# Patient Record
Sex: FEMALE | Race: WHITE | NOT HISPANIC OR LATINO | Employment: FULL TIME | URBAN - METROPOLITAN AREA
[De-identification: names, ages, dates, MRNs, and addresses within clinical notes are randomized per-mention and may not be internally consistent; named-entity substitution may affect disease eponyms.]

---

## 2017-01-03 ENCOUNTER — ROUTINE PRENATAL (OUTPATIENT)
Dept: OBSTETRICS AND GYNECOLOGY | Facility: CLINIC | Age: 37
End: 2017-01-03
Payer: COMMERCIAL

## 2017-01-03 VITALS
BODY MASS INDEX: 26.69 KG/M2 | WEIGHT: 165.38 LBS | DIASTOLIC BLOOD PRESSURE: 78 MMHG | SYSTOLIC BLOOD PRESSURE: 120 MMHG

## 2017-01-03 DIAGNOSIS — O09.522 AMA (ADVANCED MATERNAL AGE) MULTIGRAVIDA 35+, SECOND TRIMESTER: ICD-10-CM

## 2017-01-03 DIAGNOSIS — Z98.84 HISTORY OF GASTRIC BYPASS: ICD-10-CM

## 2017-01-03 DIAGNOSIS — Z3A.14 14 WEEKS GESTATION OF PREGNANCY: Primary | ICD-10-CM

## 2017-01-03 DIAGNOSIS — D64.9 ANEMIA, UNSPECIFIED TYPE: ICD-10-CM

## 2017-01-03 DIAGNOSIS — Z98.891 H/O CESAREAN SECTION: ICD-10-CM

## 2017-01-03 PROCEDURE — 99999 PR PBB SHADOW E&M-EST. PATIENT-LVL I: CPT | Mod: PBBFAC,,, | Performed by: OBSTETRICS & GYNECOLOGY

## 2017-01-03 PROCEDURE — 0502F SUBSEQUENT PRENATAL CARE: CPT | Mod: S$GLB,,, | Performed by: OBSTETRICS & GYNECOLOGY

## 2017-02-02 ENCOUNTER — ROUTINE PRENATAL (OUTPATIENT)
Dept: OBSTETRICS AND GYNECOLOGY | Facility: CLINIC | Age: 37
End: 2017-02-02
Payer: COMMERCIAL

## 2017-02-02 VITALS
DIASTOLIC BLOOD PRESSURE: 70 MMHG | SYSTOLIC BLOOD PRESSURE: 116 MMHG | BODY MASS INDEX: 26.33 KG/M2 | WEIGHT: 163.13 LBS

## 2017-02-02 DIAGNOSIS — Z3A.18 18 WEEKS GESTATION OF PREGNANCY: Primary | ICD-10-CM

## 2017-02-02 DIAGNOSIS — D64.9 ANEMIA, UNSPECIFIED TYPE: ICD-10-CM

## 2017-02-02 DIAGNOSIS — O09.522 AMA (ADVANCED MATERNAL AGE) MULTIGRAVIDA 35+, SECOND TRIMESTER: ICD-10-CM

## 2017-02-02 DIAGNOSIS — A60.00 GENITAL HERPES SIMPLEX, UNSPECIFIED SITE: ICD-10-CM

## 2017-02-02 DIAGNOSIS — Z98.891 H/O CESAREAN SECTION: ICD-10-CM

## 2017-02-02 PROCEDURE — 0502F SUBSEQUENT PRENATAL CARE: CPT | Mod: S$GLB,,, | Performed by: OBSTETRICS & GYNECOLOGY

## 2017-02-02 PROCEDURE — 99999 PR PBB SHADOW E&M-EST. PATIENT-LVL I: CPT | Mod: PBBFAC,,, | Performed by: OBSTETRICS & GYNECOLOGY

## 2017-02-23 ENCOUNTER — OFFICE VISIT (OUTPATIENT)
Dept: MATERNAL FETAL MEDICINE | Facility: CLINIC | Age: 37
End: 2017-02-23
Payer: COMMERCIAL

## 2017-02-23 DIAGNOSIS — O09.522 AMA (ADVANCED MATERNAL AGE) MULTIGRAVIDA 35+, SECOND TRIMESTER: ICD-10-CM

## 2017-02-23 DIAGNOSIS — O09.523 ADVANCED MATERNAL AGE IN MULTIGRAVIDA, THIRD TRIMESTER: Primary | ICD-10-CM

## 2017-02-23 PROCEDURE — 99499 UNLISTED E&M SERVICE: CPT | Mod: S$GLB,,, | Performed by: OBSTETRICS & GYNECOLOGY

## 2017-02-23 PROCEDURE — 76811 OB US DETAILED SNGL FETUS: CPT | Mod: S$GLB,,, | Performed by: OBSTETRICS & GYNECOLOGY

## 2017-02-23 NOTE — PROGRESS NOTES
Repeat OB ultrasound (see full report under imaging tab in EPIC)    Report Summary:  Impression:   A detailed fetal anatomic ultrasound examination was performed today due to the following high risk indication: advanced maternal age.  No fetal structural abnormalities are identified today, and fetal size is appropriate for EGA.  Placental location is normal without evidence of previa, and cervical length is normal.

## 2017-02-27 ENCOUNTER — ROUTINE PRENATAL (OUTPATIENT)
Dept: OBSTETRICS AND GYNECOLOGY | Facility: CLINIC | Age: 37
End: 2017-02-27
Payer: COMMERCIAL

## 2017-02-27 VITALS
SYSTOLIC BLOOD PRESSURE: 120 MMHG | DIASTOLIC BLOOD PRESSURE: 70 MMHG | WEIGHT: 171.06 LBS | BODY MASS INDEX: 27.61 KG/M2

## 2017-02-27 DIAGNOSIS — O99.842 PREVIOUS GASTRIC BYPASS AFFECTING PREGNANCY IN SECOND TRIMESTER, ANTEPARTUM: Primary | ICD-10-CM

## 2017-02-27 DIAGNOSIS — Z34.82 PRENATAL CARE, SUBSEQUENT PREGNANCY, SECOND TRIMESTER: ICD-10-CM

## 2017-02-27 PROCEDURE — 0502F SUBSEQUENT PRENATAL CARE: CPT | Mod: S$GLB,,, | Performed by: NURSE PRACTITIONER

## 2017-02-27 PROCEDURE — 99999 PR PBB SHADOW E&M-EST. PATIENT-LVL II: CPT | Mod: PBBFAC,,, | Performed by: NURSE PRACTITIONER

## 2017-02-27 NOTE — PROGRESS NOTES
"Had MFM anatomy US 2-23-17 and "it's a boy"!; Just back from cruise, was nauseous and only vomited when came home - has Rx and not losing weight; c/o itchy rash on abdomen same as when she had the twins and doesn't want Rx or Dermatology referral; Denies crampinng, bleeding; Discussed diet, wt gain, second trimester; F/U 4 weeks for DM screen, CBC and visit.  "

## 2017-03-30 ENCOUNTER — ROUTINE PRENATAL (OUTPATIENT)
Dept: OBSTETRICS AND GYNECOLOGY | Facility: CLINIC | Age: 37
End: 2017-03-30
Payer: COMMERCIAL

## 2017-03-30 ENCOUNTER — LAB VISIT (OUTPATIENT)
Dept: LAB | Facility: OTHER | Age: 37
End: 2017-03-30
Attending: NURSE PRACTITIONER
Payer: COMMERCIAL

## 2017-03-30 ENCOUNTER — TELEPHONE (OUTPATIENT)
Dept: OBSTETRICS AND GYNECOLOGY | Facility: CLINIC | Age: 37
End: 2017-03-30

## 2017-03-30 VITALS
DIASTOLIC BLOOD PRESSURE: 72 MMHG | SYSTOLIC BLOOD PRESSURE: 120 MMHG | WEIGHT: 176.38 LBS | BODY MASS INDEX: 28.47 KG/M2

## 2017-03-30 DIAGNOSIS — D64.9 ANEMIA, UNSPECIFIED TYPE: ICD-10-CM

## 2017-03-30 DIAGNOSIS — Z3A.26 26 WEEKS GESTATION OF PREGNANCY: Primary | ICD-10-CM

## 2017-03-30 DIAGNOSIS — Z34.82 PRENATAL CARE, SUBSEQUENT PREGNANCY, SECOND TRIMESTER: ICD-10-CM

## 2017-03-30 DIAGNOSIS — Z98.84 HISTORY OF GASTRIC BYPASS: ICD-10-CM

## 2017-03-30 DIAGNOSIS — R73.09 GLUCOSE TOLERANCE TEST ABNORMAL: Primary | ICD-10-CM

## 2017-03-30 DIAGNOSIS — O26.12 LOW WEIGHT GAIN DURING PREGNANCY IN SECOND TRIMESTER: ICD-10-CM

## 2017-03-30 DIAGNOSIS — O09.522 AMA (ADVANCED MATERNAL AGE) MULTIGRAVIDA 35+, SECOND TRIMESTER: ICD-10-CM

## 2017-03-30 DIAGNOSIS — Z98.891 H/O CESAREAN SECTION: ICD-10-CM

## 2017-03-30 LAB
BASOPHILS # BLD AUTO: 0.01 K/UL
BASOPHILS NFR BLD: 0.1 %
DIFFERENTIAL METHOD: ABNORMAL
EOSINOPHIL # BLD AUTO: 0.1 K/UL
EOSINOPHIL NFR BLD: 1 %
ERYTHROCYTE [DISTWIDTH] IN BLOOD BY AUTOMATED COUNT: 13.7 %
GLUCOSE SERPL-MCNC: 158 MG/DL
HCT VFR BLD AUTO: 30.2 %
HGB BLD-MCNC: 10 G/DL
LYMPHOCYTES # BLD AUTO: 1.6 K/UL
LYMPHOCYTES NFR BLD: 21.9 %
MCH RBC QN AUTO: 29.9 PG
MCHC RBC AUTO-ENTMCNC: 33.1 %
MCV RBC AUTO: 90 FL
MONOCYTES # BLD AUTO: 0.4 K/UL
MONOCYTES NFR BLD: 6 %
NEUTROPHILS # BLD AUTO: 5.2 K/UL
NEUTROPHILS NFR BLD: 70.9 %
PLATELET # BLD AUTO: 261 K/UL
PMV BLD AUTO: 10.1 FL
RBC # BLD AUTO: 3.35 M/UL
WBC # BLD AUTO: 7.36 K/UL

## 2017-03-30 PROCEDURE — 0502F SUBSEQUENT PRENATAL CARE: CPT | Mod: S$GLB,,, | Performed by: OBSTETRICS & GYNECOLOGY

## 2017-03-30 PROCEDURE — 85025 COMPLETE CBC W/AUTO DIFF WBC: CPT

## 2017-03-30 PROCEDURE — 99999 PR PBB SHADOW E&M-EST. PATIENT-LVL II: CPT | Mod: PBBFAC,,, | Performed by: OBSTETRICS & GYNECOLOGY

## 2017-03-30 PROCEDURE — 82950 GLUCOSE TEST: CPT

## 2017-03-30 NOTE — TELEPHONE ENCOUNTER
PHONE CALL: LM that she did not pass her Diabetes Screen and that Ms Marino, our , will call her to set up a 3 hour test. Also, advised she is slightly anemic and to get an over the counter iron (Ferrous sulfate 325 mg) and take one daily with orange juice to increase it's absorption in addition to her prenatal vitamins. Merissa Bustamante NP

## 2017-03-31 PROBLEM — E53.8 B12 DEFICIENCY: Status: ACTIVE | Noted: 2017-03-31

## 2017-04-05 ENCOUNTER — TELEPHONE (OUTPATIENT)
Dept: OBSTETRICS AND GYNECOLOGY | Facility: CLINIC | Age: 37
End: 2017-04-05

## 2017-04-05 NOTE — TELEPHONE ENCOUNTER
----- Message from Radhika Greenwood sent at 4/5/2017  4:58 PM CDT -----  Contact: the pt  Patient is requesting her 3hrGTT results. She can be reached at [phone]. Thank you.

## 2017-04-05 NOTE — TELEPHONE ENCOUNTER
----- Message from Natalee DANETTE Haile sent at 4/5/2017  2:16 PM CDT -----  Contact: pt   X_  1st Request  _  2nd Request  _  3rd Request        Who: JO ANN PANIAGUA [[id number]]    Why: pt is calling in regards to her test results that were faxed over from [facility]    What Number to Call Back: [phone].    When to Expect a call back: (Before the end of the day)   -- if the call is after 12:00, the call back will be tomorrow.

## 2017-04-05 NOTE — TELEPHONE ENCOUNTER
"Spoke to patient. Pt states that her records were sent over from Bruno"s daughter and called for 3 hr GTT results.  "

## 2017-04-20 ENCOUNTER — ROUTINE PRENATAL (OUTPATIENT)
Dept: OBSTETRICS AND GYNECOLOGY | Facility: CLINIC | Age: 37
End: 2017-04-20
Payer: COMMERCIAL

## 2017-04-20 ENCOUNTER — CLINICAL SUPPORT (OUTPATIENT)
Dept: OBSTETRICS AND GYNECOLOGY | Facility: CLINIC | Age: 37
End: 2017-04-20
Payer: COMMERCIAL

## 2017-04-20 VITALS
BODY MASS INDEX: 28.11 KG/M2 | WEIGHT: 174.19 LBS | DIASTOLIC BLOOD PRESSURE: 70 MMHG | SYSTOLIC BLOOD PRESSURE: 114 MMHG

## 2017-04-20 DIAGNOSIS — O09.523 AMA (ADVANCED MATERNAL AGE) MULTIGRAVIDA 35+, THIRD TRIMESTER: ICD-10-CM

## 2017-04-20 DIAGNOSIS — Z98.891 H/O CESAREAN SECTION: ICD-10-CM

## 2017-04-20 DIAGNOSIS — D64.9 ANEMIA, UNSPECIFIED TYPE: ICD-10-CM

## 2017-04-20 DIAGNOSIS — Z3A.29 29 WEEKS GESTATION OF PREGNANCY: Primary | ICD-10-CM

## 2017-04-20 DIAGNOSIS — E53.8 B12 DEFICIENCY: ICD-10-CM

## 2017-04-20 DIAGNOSIS — O26.12 LOW WEIGHT GAIN DURING PREGNANCY IN SECOND TRIMESTER: ICD-10-CM

## 2017-04-20 DIAGNOSIS — Z98.84 HISTORY OF GASTRIC BYPASS: ICD-10-CM

## 2017-04-20 DIAGNOSIS — Z23 NEED FOR TDAP VACCINATION: Primary | ICD-10-CM

## 2017-04-20 PROCEDURE — 99999 PR PBB SHADOW E&M-EST. PATIENT-LVL I: CPT | Mod: PBBFAC,,, | Performed by: OBSTETRICS & GYNECOLOGY

## 2017-04-20 PROCEDURE — 0502F SUBSEQUENT PRENATAL CARE: CPT | Mod: S$GLB,,, | Performed by: OBSTETRICS & GYNECOLOGY

## 2017-04-20 PROCEDURE — 99999 PR PBB SHADOW E&M-EST. PATIENT-LVL I: CPT | Mod: PBBFAC,,,

## 2017-04-20 PROCEDURE — 90715 TDAP VACCINE 7 YRS/> IM: CPT | Mod: S$GLB,,, | Performed by: OBSTETRICS & GYNECOLOGY

## 2017-04-20 PROCEDURE — 90471 IMMUNIZATION ADMIN: CPT | Mod: S$GLB,,, | Performed by: OBSTETRICS & GYNECOLOGY

## 2017-05-10 ENCOUNTER — HOSPITAL ENCOUNTER (EMERGENCY)
Facility: OTHER | Age: 37
Discharge: HOME | End: 2017-05-10
Attending: OBSTETRICS & GYNECOLOGY
Payer: COMMERCIAL

## 2017-05-10 ENCOUNTER — ROUTINE PRENATAL (OUTPATIENT)
Dept: OBSTETRICS AND GYNECOLOGY | Facility: CLINIC | Age: 37
End: 2017-05-10
Payer: COMMERCIAL

## 2017-05-10 ENCOUNTER — OFFICE VISIT (OUTPATIENT)
Dept: MATERNAL FETAL MEDICINE | Facility: CLINIC | Age: 37
End: 2017-05-10
Payer: COMMERCIAL

## 2017-05-10 VITALS
BODY MASS INDEX: 29.04 KG/M2 | DIASTOLIC BLOOD PRESSURE: 72 MMHG | SYSTOLIC BLOOD PRESSURE: 108 MMHG | WEIGHT: 179.88 LBS

## 2017-05-10 VITALS
OXYGEN SATURATION: 97 % | SYSTOLIC BLOOD PRESSURE: 119 MMHG | RESPIRATION RATE: 14 BRPM | DIASTOLIC BLOOD PRESSURE: 82 MMHG | TEMPERATURE: 97 F | HEART RATE: 66 BPM

## 2017-05-10 DIAGNOSIS — O09.523 AMA (ADVANCED MATERNAL AGE) MULTIGRAVIDA 35+, THIRD TRIMESTER: ICD-10-CM

## 2017-05-10 DIAGNOSIS — O36.8131 DECREASED FETAL MOVEMENT, THIRD TRIMESTER, FETUS 1: Primary | ICD-10-CM

## 2017-05-10 DIAGNOSIS — Z98.84 HISTORY OF GASTRIC BYPASS: ICD-10-CM

## 2017-05-10 DIAGNOSIS — D64.9 ANEMIA, UNSPECIFIED TYPE: ICD-10-CM

## 2017-05-10 DIAGNOSIS — A60.00 GENITAL HERPES SIMPLEX, UNSPECIFIED SITE: ICD-10-CM

## 2017-05-10 DIAGNOSIS — O99.891 CURRENT MATERNAL CONDITION AFFECTING PREGNANCY: ICD-10-CM

## 2017-05-10 DIAGNOSIS — Z3A.32 32 WEEKS GESTATION OF PREGNANCY: Primary | ICD-10-CM

## 2017-05-10 DIAGNOSIS — O09.523 ADVANCED MATERNAL AGE IN MULTIGRAVIDA, THIRD TRIMESTER: ICD-10-CM

## 2017-05-10 DIAGNOSIS — O26.12 LOW WEIGHT GAIN DURING PREGNANCY IN SECOND TRIMESTER: ICD-10-CM

## 2017-05-10 DIAGNOSIS — Z98.891 H/O CESAREAN SECTION: ICD-10-CM

## 2017-05-10 DIAGNOSIS — O36.8131 DECREASED FETAL MOVEMENT, THIRD TRIMESTER, FETUS 1: ICD-10-CM

## 2017-05-10 DIAGNOSIS — E53.8 B12 DEFICIENCY: ICD-10-CM

## 2017-05-10 DIAGNOSIS — Z36.89 ENCOUNTER FOR ULTRASOUND TO CHECK FETAL GROWTH: Primary | ICD-10-CM

## 2017-05-10 PROBLEM — O36.8130 DECREASED FETAL MOVEMENTS IN THIRD TRIMESTER: Status: ACTIVE | Noted: 2017-05-10

## 2017-05-10 LAB
ALBUMIN SERPL BCP-MCNC: 2.6 G/DL
ALP SERPL-CCNC: 146 U/L
ALT SERPL W/O P-5'-P-CCNC: 8 U/L
ANION GAP SERPL CALC-SCNC: 8 MMOL/L
AST SERPL-CCNC: 12 U/L
BASOPHILS # BLD AUTO: 0.02 K/UL
BASOPHILS NFR BLD: 0.2 %
BILIRUB SERPL-MCNC: 0.4 MG/DL
BILIRUB SERPL-MCNC: NORMAL MG/DL
BLOOD URINE, POC: NORMAL
BUN SERPL-MCNC: 3 MG/DL
CALCIUM SERPL-MCNC: 8.4 MG/DL
CHLORIDE SERPL-SCNC: 108 MMOL/L
CO2 SERPL-SCNC: 22 MMOL/L
COLOR, POC UA: YELLOW
CREAT SERPL-MCNC: 0.5 MG/DL
DIFFERENTIAL METHOD: ABNORMAL
EOSINOPHIL # BLD AUTO: 0.1 K/UL
EOSINOPHIL NFR BLD: 1 %
ERYTHROCYTE [DISTWIDTH] IN BLOOD BY AUTOMATED COUNT: 12.7 %
EST. GFR  (AFRICAN AMERICAN): >60 ML/MIN/1.73 M^2
EST. GFR  (NON AFRICAN AMERICAN): >60 ML/MIN/1.73 M^2
GLUCOSE SERPL-MCNC: 77 MG/DL
GLUCOSE UR QL STRIP: NORMAL
HCT VFR BLD AUTO: 29 %
HGB BLD-MCNC: 9.5 G/DL
KETONES UR QL STRIP: NORMAL
LEUKOCYTE ESTERASE URINE, POC: NORMAL
LYMPHOCYTES # BLD AUTO: 2.2 K/UL
LYMPHOCYTES NFR BLD: 26.4 %
MCH RBC QN AUTO: 27.7 PG
MCHC RBC AUTO-ENTMCNC: 32.8 %
MCV RBC AUTO: 85 FL
MONOCYTES # BLD AUTO: 0.6 K/UL
MONOCYTES NFR BLD: 7 %
NEUTROPHILS # BLD AUTO: 5.4 K/UL
NEUTROPHILS NFR BLD: 65 %
NITRITE, POC UA: NORMAL
PH, POC UA: 6
PLATELET # BLD AUTO: 253 K/UL
PMV BLD AUTO: 10.2 FL
POTASSIUM SERPL-SCNC: 3.4 MMOL/L
PROT SERPL-MCNC: 6.1 G/DL
PROTEIN, POC: NORMAL
RBC # BLD AUTO: 3.43 M/UL
SODIUM SERPL-SCNC: 138 MMOL/L
SPECIFIC GRAVITY, POC UA: 1.01
UROBILINOGEN, POC UA: NORMAL
WBC # BLD AUTO: 8.29 K/UL

## 2017-05-10 PROCEDURE — 99999 PR PBB SHADOW E&M-EST. PATIENT-LVL II: CPT | Mod: PBBFAC,,, | Performed by: OBSTETRICS & GYNECOLOGY

## 2017-05-10 PROCEDURE — 99284 EMERGENCY DEPT VISIT MOD MDM: CPT | Mod: 25

## 2017-05-10 PROCEDURE — 59025 FETAL NON-STRESS TEST: CPT

## 2017-05-10 PROCEDURE — 85025 COMPLETE CBC W/AUTO DIFF WBC: CPT

## 2017-05-10 PROCEDURE — 0502F SUBSEQUENT PRENATAL CARE: CPT | Mod: S$GLB,,, | Performed by: OBSTETRICS & GYNECOLOGY

## 2017-05-10 PROCEDURE — 99499 UNLISTED E&M SERVICE: CPT | Mod: S$GLB,,, | Performed by: OBSTETRICS & GYNECOLOGY

## 2017-05-10 PROCEDURE — 76818 FETAL BIOPHYS PROFILE W/NST: CPT | Mod: 26,,, | Performed by: OBSTETRICS & GYNECOLOGY

## 2017-05-10 PROCEDURE — 96360 HYDRATION IV INFUSION INIT: CPT

## 2017-05-10 PROCEDURE — 25000003 PHARM REV CODE 250: Performed by: STUDENT IN AN ORGANIZED HEALTH CARE EDUCATION/TRAINING PROGRAM

## 2017-05-10 PROCEDURE — 76819 FETAL BIOPHYS PROFIL W/O NST: CPT | Mod: S$GLB,,, | Performed by: OBSTETRICS & GYNECOLOGY

## 2017-05-10 PROCEDURE — 81002 URINALYSIS NONAUTO W/O SCOPE: CPT

## 2017-05-10 PROCEDURE — 99284 EMERGENCY DEPT VISIT MOD MDM: CPT | Mod: 25,,, | Performed by: OBSTETRICS & GYNECOLOGY

## 2017-05-10 PROCEDURE — 80053 COMPREHEN METABOLIC PANEL: CPT

## 2017-05-10 PROCEDURE — 76816 OB US FOLLOW-UP PER FETUS: CPT | Mod: S$GLB,,, | Performed by: OBSTETRICS & GYNECOLOGY

## 2017-05-10 RX ADMIN — SODIUM CHLORIDE, SODIUM LACTATE, POTASSIUM CHLORIDE, AND CALCIUM CHLORIDE 1000 ML: .6; .31; .03; .02 INJECTION, SOLUTION INTRAVENOUS at 12:05

## 2017-05-10 NOTE — PROGRESS NOTES
L&D triage notify that pt will be coming up for evaluation due to BPP of 4/8 and reports of diarrhea times 3 days per Dr Suarez. Pt ambulated out of clinic and directed to 6th floor with family member at side.

## 2017-05-10 NOTE — PROGRESS NOTES
HAD PINK SPOT THIS AM. UNABLE TO TOLERATE ORAL FE. DIARRHEA X 3 DAYS. BABY NOT MOVING AS MUCH THIS MORNING. LONG D/W PT ON FE THERAPY.

## 2017-05-10 NOTE — ED PROVIDER NOTES
Encounter Date: 5/10/2017       History     Chief Complaint   Patient presents with    Non-stress Test     Review of patient's allergies indicates:   Allergen Reactions    Sulfa (sulfonamide antibiotics) Nausea And Vomiting    Lortab asa [hydrocodone-aspirin]      Liquid only     HPI Comments: Silvia Bustamante is a 36 y.o. [id number] at 32w3d presents from clinic with a BPP 4/8. Patient also reports loose stool once per day over the last three days. Patient denies sick contacts, N/V, fevers, malaise. She lives 2.5 hours away.    This IUP is complicated by pre term delivery with twins.  Patient denies contractions, denies vaginal bleeding, denies LOF.   Fetal Movement: normal.      The history is provided by the patient.     Past Medical History:   Diagnosis Date    Herpes simplex without mention of complication      Past Surgical History:   Procedure Laterality Date    APPENDECTOMY      corrective surgery for adhesion due to the appendectomy     SECTION  2014    X 1---KJB    GASTRIC BYPASS  2013    AFTER HER BAND BROKE    HERNIA REPAIR      x2    LAPAROSCOPIC GASTRIC BANDING  2009    MOUTH SURGERY      Plastic surgery on chin       Family History   Problem Relation Age of Onset    Diabetes Maternal Grandmother     Hypertension Maternal Grandmother     Colon cancer Paternal Grandfather     Hypertension Paternal Grandfather     Hypertension Paternal Grandmother     Hypertension Maternal Grandfather     Hypertension Father     Hypertension Mother     Hypertension Brother     Breast cancer Neg Hx     Ovarian cancer Neg Hx     Cancer Neg Hx      Social History   Substance Use Topics    Smoking status: Never Smoker    Smokeless tobacco: Not on file    Alcohol use Yes      Comment: Socially ( pre pregnancy)     Review of Systems   Constitutional: Negative for fever.   HENT: Negative for sore throat.    Respiratory: Negative for shortness of breath.    Cardiovascular: Negative for chest pain.    Gastrointestinal: Negative for abdominal pain, anal bleeding, blood in stool, constipation and nausea.   Genitourinary: Negative for dysuria.   Musculoskeletal: Negative for back pain.   Skin: Negative for rash.   Neurological: Negative for facial asymmetry, weakness, light-headedness and headaches.   Hematological: Does not bruise/bleed easily.       Physical Exam   Initial Vitals   BP Pulse Resp Temp SpO2   -- -- -- -- --          BP: (108)/(72) 108/72    Physical Exam    Nursing note and vitals reviewed.  Constitutional: She appears well-developed and well-nourished. She is not diaphoretic. No distress.   HENT:   Head: Normocephalic and atraumatic.   Eyes: Conjunctivae and EOM are normal.   Neck: Normal range of motion.   Cardiovascular: Normal rate and regular rhythm.   Pulmonary/Chest: Breath sounds normal. No respiratory distress. She has no wheezes.   Abdominal: Soft. Bowel sounds are normal. She exhibits distension (gravid).   Musculoskeletal: Normal range of motion.   Neurological: She is alert and oriented to person, place, and time.   Skin: Skin is warm and dry.   Psychiatric: She has a normal mood and affect. Her behavior is normal. Judgment and thought content normal.     OB LABOR EXAM:                     Comments: NST Interpretation:   110 BPM baseline  Variability: moderate  Accelerations: present  Decelerations: absent    Clinical Impression: Reactive Non-Stress Test         ED Course   Procedures  Labs Reviewed   CBC W/ AUTO DIFFERENTIAL   COMPREHENSIVE METABOLIC PANEL   POCT URINALYSIS, DIPSTICK OR TABLET REAGENT, AUTOMATED, WITH MICROSCOP             Medical Decision Making:   Initial Assessment:   37 yo  at 32w3 days with BPP 6/10  Differential Diagnosis:   BPP 6/10 with 10/10 repeat  ED Management:  - CMP, CBC - wnl  - VSS  - NST - reactive with very good variability and accels  - Repeat BPP 5 hours later was 10/10  - Patient counseled on kick counts, decreased fetal movement, vaginal  bleeding, contractions  - Discharged in stable condition  - Follow up next week with primary OB              Attending Attestation:   Physician Attestation Statement for Resident:  As the supervising MD   Physician Attestation Statement: I have personally seen and examined this patient.   I agree with the above history. -:   As the supervising MD I agree with the above PE.    As the supervising MD I agree with the above treatment, course, plan, and disposition.   -:   NST  I independently reviewed the fetal non-stress test with the following interpretation:  110 BPM baseline  Variability: moderate  Accelerations: present  Decelerations: absent  Contractions: none  Category 1    Clinical Interpretation: reactive    Patient evaluated and found to be stable, agree with resident's assessment and plan for prolonged monitoring and repeat BPP this pm. Patient discharged after reassuring testing, repeat BPP with instructions regarding movement counts, follow up visits.  I was personally present during the critical portions of the procedure(s) performed by the resident and was immediately available in the ED to provide services and assistance as needed during the entire procedure.  I have reviewed the following: old records at this facility.                    ED Course     Clinical Impression:   The encounter diagnosis was Decreased fetal movement, third trimester, fetus 1.          Thierry Lazcano MD  Resident  05/10/17 4003       Nel Hess MD  05/12/17 1213

## 2017-05-10 NOTE — DISCHARGE INSTRUCTIONS
Call clinic [phone] or L & D after hours at [phone] for vaginal bleeding, leakage of fluids, regular contractions every 5 mins for 2 hours, decreased fetal movements ( 10 kicks in 2 hours), headache not relieved by Tylenol, blurry vision, or temp of 100.4 or greater.  Begin doing fetal kick counts, at least 10 movements in 2 hours starting at 28 weeks gestation.  Keep next clinic appointment

## 2017-05-10 NOTE — ED NOTES
Another BPP done 8/8. NST reactive and reassuring, baseline 130. Discharge instructions and patient is to follow up with Dr. Foster in 1 week.

## 2017-05-10 NOTE — ED AVS SNAPSHOT
[facility]  [address]  [place] [facility] [address]               Silvia Bustamante   5/10/2017 11:15 AM   ED    Description:  Female : 1980   Department:  [facility]           Your Care was Coordinated By:     Provider Role From To    Nel Hess MD Attending Provider 05/10/17 1128 --      Reason for Visit     Non-stress Test           Diagnoses this Visit        Comments    Decreased fetal movement, third trimester, fetus 1    -  Primary       ED Disposition     ED Disposition Condition Comment    Discharge  Sent from clinic [facility]            To Do List           Follow-up Information     Follow up with Ralf Foster Jr, MD In 1 week.    Specialties:  Obstetrics, Obstetrics and Gynecology    Contact information:    [address]  [place] [address]  [phone]        [facility]     [facility] Nurse Care Line -  Assistance  Unless otherwise directed by your provider, please contact [facility], our nurse care line that is available for  assistance.     Registered nurses in the [facility] provide: appointment scheduling, clinical advisement, health education, and other advisory services.  Call: [phone] (toll free)               Medications           Message regarding Medications     Verify the changes and/or additions to your medication regime listed below are the same as discussed with your clinician today.  If any of these changes or additions are incorrect, please notify your healthcare provider.        These medications were administered today        Dose Freq    lactated ringers bolus 1,000 mL 1,000 mL Once    Sig: Inject 1,000 mLs into the vein once.    Class: Normal    Route: Intravenous           Verify that the below list of medications is an accurate representation of the medications you are currently taking.  If none reported, the list may be blank. If incorrect, please contact your  healthcare provider. Carry this list with you in case of emergency.           Current Medications     doxylamine-pyridoxine 10-10 mg TbEC Take 1 capsule by mouth 3 (three) times daily as needed.    iron,carbon,gluc-FA-B12-C-DSS (FERRALET 90 DUAL-IRON DELIVERY) 90-1-12-50 mg-mg-mcg-mg Tab Take 1 capsule by mouth once daily.           Clinical Reference Information           Your Vitals Were     BP Pulse Temp Resp Last Period SpO2    119/82 66 96.8 °F (36 °C) 14 09/25/2016 97%      Allergies as of 5/10/2017        Reactions    Sulfa (Sulfonamide Antibiotics) Nausea And Vomiting    Lortab Asa [Hydrocodone-aspirin]     Liquid only      Immunizations Administered on Date of Encounter - 5/10/2017     None      ED Micro, Lab, POCT     Start Ordered       Status Ordering Provider    05/10/17 1141 05/10/17 1140  CBC auto differential  STAT      Final result     05/10/17 1141 05/10/17 1140  Comprehensive metabolic panel  STAT      Final result     05/10/17 1141 05/10/17 1140  POCT urinalysis, dipstick or tablet reag  Once      Final result       ED Imaging Orders     None        Discharge Instructions       Call clinic [phone] or L & D after hours at [phone] for vaginal bleeding, leakage of fluids, regular contractions every 5 mins for 2 hours, decreased fetal movements ( 10 kicks in 2 hours), headache not relieved by Tylenol, blurry vision, or temp of 100.4 or greater.  Begin doing fetal kick counts, at least 10 movements in 2 hours starting at 28 weeks gestation.  Keep next clinic appointment      Discharge References/Attachments     KICK COUNTS (ENGLISH)      Your Scheduled Appointments     May 24, 2017 11:00 AM CDT   Routine Prenatal Visit with Ralf Foster Jr., MD   [facility] [address] ([facility])    [address]  [facility] [id number] [phone]            May 31, 2017 10:40 AM CDT   Ultrasound with ULTRASOUND, [facility]LR [facility]   [facility] ([facility])    [address]  [facility] [address]   [phone]              [facility] Sign-Up     Activating your [facility] account is as easy as 1-2-3!     1) Visit [email or web address], select Sign Up Now, enter this activation code and your date of birth, then select Next.  [email or web address]  Expires: 6/24/2017  6:30 PM      2) Create a username and password to use when you visit [facility] in the future and select a security question in case you lose your password and select Next.    3) Enter your e-mail address and click Sign Up!    Additional Information  If you have questions, please e-mail [email or web address] or call [phone] to talk to our [facility] staff. Remember, [facility] is NOT to be used for urgent needs. For medical emergencies, dial 911.          [facility] complies with applicable Federal civil rights laws and does not discriminate on the basis of race, color, national origin, age, disability, or sex.        Language Assistance Services     ATTENTION: Language assistance services are available, free of charge. Please call [phone].      ATENCIÓN: Si habla margo, tiene a sorensen disposición servicios gratuitos de asistencia lingüística. Llame al [phone].     CHÚ Ý: N?u b?n nói Ti?ng Vi?t, có các d?ch v? h? tr? ngôn ng? mi?n phí dành cho b?n. G?i s? [phone].

## 2017-05-14 NOTE — PROGRESS NOTES
Indication  ========    Evaluation of fetal growth, MVP, and BPP without NST (Dr. Foster).    History  ======    General History  Other: AMA  C/S x 1  Gastric Bypass after band rupture    Method  ======    Transabdominal ultrasound examination. View: Sufficient.    Pregnancy  =========    Finney pregnancy. Number of fetuses: 1.    Dating  ======    LMP on: 9/15/2016  GA by LMP 33 w + 6 d  DALIA by LMP: 6/22/2017  Ultrasound examination on: 5/10/2017  GA by U/S based upon: AC, BPD, Femur, HC  GA by U/S 33 w + 0 d  DALIA by U/S: 6/28/2017  Assigned: The calculation of the gestational age based on CRL.  The Best Overall Assessment is based on the ultrasound examination on 11/09/16.  Assigned GA 32 w + 3 d  Assigned DALIA: 7/2/2017    General Evaluation  ==============    Cardiac activity: present.  bpm.  Fetal movements: visualized.  Presentation: cephalic.  Placenta: anterior.  Umbilical cord: 3 vessel cord.  Amniotic fluid: MVP 5.1 cm.    Biophysical Profile  ==============    2: Fetal breathing movements  0: Gross body movements  0: Fetal tone  2: Amniotic fluid volume  4/8: Biophysical profile score    Fetal Biometry  ============    Fetal Biometry  BPD 77.6 mm 31w 1d Hadlock  .5 mm 38w 2d Deirdre  .4 mm 34w 3d Hadlock  .4 mm 33w 2d Hadlock  Femur 63.9 mm 33w 0d Hadlock  Cerebellum tr 43.6 mm 35w 0d Villalpando  CM 5.9 mm  EFW 2,128 g 62% 32w 6d Hadlock  Calculated by: Hadlock (BPD-HC-AC-FL)  EFW (lb) 4 lb  EFW (oz) 11 oz  Cephalic index 0.69  HC / AC 1.05  FL / BPD 0.82  FL / AC 0.22  MVP 5.1 cm   bpm  Head / Face / Neck   6.3 mm    Fetal Anatomy  ============    Cranium: normal  Lateral ventricles: normal  Choroid plexus: normal  Midline falx: documented previously  Cavum septi pellucidi: documented previously  Cerebellum: normal  Cisterna magna: normal  Lips: not visualized  Profile: documented previously  Nose: documented previously  4-chamber view: documented  previously  RVOT: documented previously  LVOT: documented previously  Diaphragm: normal  Cord insertion: documented previously  Stomach: normal  Kidneys: normal  Bladder: normal  Genitals: documented previously  Cervical spine: documented previously  Thoracic spine: documented previously  Lumbar spine: documented previously  Sacral spine: documented previously  Arms: documented previously  Legs: documented previously    Impression  =========    1. 32w 3d ríos pregnancy  2. Normal interval fetal growth (EFW = 2128 gms at 62%)  3. No obvious fetal structural abnormalities appreciated  4. BPP without NST = 4/8  5. Amniotic fluid volume wnl (MVP 6.1cm)    Patient counseled on today's ultrasound. She reports diarrhea x 3 days, emesis throughout the pregnancy, and loose stool this am. She  reports vaginal pink discharge today. Patient counseled that she will be sent to Labor and delivery for further maternal/fetal evaluation and  surveillance secondary to BPP 4 /8.      Recommendation  ==============    Dr. Foster notified of above  Patient to be sent to labor and delivery for fetal monitoring secondary to BPP 4/8 and further maternal/fetal evaluation and surveillance.    Follow up sono with MFM in 3 wks for interval fetal growth, MVP, and overall fetal well being .

## 2017-05-24 ENCOUNTER — ROUTINE PRENATAL (OUTPATIENT)
Dept: OBSTETRICS AND GYNECOLOGY | Facility: CLINIC | Age: 37
End: 2017-05-24
Payer: COMMERCIAL

## 2017-05-24 VITALS
SYSTOLIC BLOOD PRESSURE: 110 MMHG | WEIGHT: 180.75 LBS | BODY MASS INDEX: 29.18 KG/M2 | DIASTOLIC BLOOD PRESSURE: 70 MMHG

## 2017-05-24 DIAGNOSIS — A60.00 GENITAL HERPES SIMPLEX, UNSPECIFIED SITE: ICD-10-CM

## 2017-05-24 DIAGNOSIS — D64.9 ANEMIA, UNSPECIFIED TYPE: ICD-10-CM

## 2017-05-24 DIAGNOSIS — Z3A.34 34 WEEKS GESTATION OF PREGNANCY: Primary | ICD-10-CM

## 2017-05-24 DIAGNOSIS — O26.12 LOW WEIGHT GAIN DURING PREGNANCY IN SECOND TRIMESTER: ICD-10-CM

## 2017-05-24 DIAGNOSIS — Z98.84 HISTORY OF GASTRIC BYPASS: ICD-10-CM

## 2017-05-24 DIAGNOSIS — Z98.891 H/O CESAREAN SECTION: ICD-10-CM

## 2017-05-24 DIAGNOSIS — O09.523 AMA (ADVANCED MATERNAL AGE) MULTIGRAVIDA 35+, THIRD TRIMESTER: ICD-10-CM

## 2017-05-24 PROBLEM — O36.8130 DECREASED FETAL MOVEMENTS IN THIRD TRIMESTER: Status: RESOLVED | Noted: 2017-05-10 | Resolved: 2017-05-24

## 2017-05-24 PROCEDURE — 0502F SUBSEQUENT PRENATAL CARE: CPT | Mod: S$GLB,,, | Performed by: OBSTETRICS & GYNECOLOGY

## 2017-05-24 PROCEDURE — 99999 PR PBB SHADOW E&M-EST. PATIENT-LVL I: CPT | Mod: PBBFAC,,, | Performed by: OBSTETRICS & GYNECOLOGY

## 2017-05-31 ENCOUNTER — OFFICE VISIT (OUTPATIENT)
Dept: MATERNAL FETAL MEDICINE | Facility: CLINIC | Age: 37
End: 2017-05-31
Payer: COMMERCIAL

## 2017-05-31 DIAGNOSIS — Z36.89 ENCOUNTER FOR ULTRASOUND TO CHECK FETAL GROWTH: ICD-10-CM

## 2017-05-31 PROCEDURE — 99499 UNLISTED E&M SERVICE: CPT | Mod: S$GLB,,, | Performed by: PEDIATRICS

## 2017-05-31 PROCEDURE — 76819 FETAL BIOPHYS PROFIL W/O NST: CPT | Mod: S$GLB,,, | Performed by: PEDIATRICS

## 2017-05-31 PROCEDURE — 76816 OB US FOLLOW-UP PER FETUS: CPT | Mod: S$GLB,,, | Performed by: PEDIATRICS

## 2017-05-31 NOTE — LETTER
June 2, 2017      Ralf Foster Jr., MD  [address]  [address]  [place] [address]           [facility]  [address]  [facility] [address]  Phone: [phone]          Patient: Silvia Bustamante   MR Number: [id number]   YOB: 1980   Date of Visit: 5/31/2017       Dear Dr. Ralf Foster Jr.:    Thank you for referring Silvia Bustamante to me for evaluation. Attached you will find relevant portions of my assessment and plan of care.    If you have questions, please do not hesitate to call me. I look forward to following Silvia Bustamante along with you.    Sincerely,    Racheal Miller MD    Enclosure  CC:  No Recipients    If you would like to receive this communication electronically, please contact [email or web address] or [phone] to request more information on [employer] access.    For providers and/or their staff who would like to refer a patient to [facility], please contact us through our one-stop-shop provider referral line, [facility], at [phone].    If you feel you have received this communication in error or would no longer like to receive these types of communications, please e-mail [email or web address]

## 2017-06-02 NOTE — PROGRESS NOTES
Indication  ========    Evaluation of fetal well-being. Follow-up evaluation for fetal growth.    History  ======    General History  Other: AMA  C/S x 1  Gastric Bypass after band rupture    Method  ======    Transabdominal ultrasound examination. View: Sufficient.    Pregnancy  =========    Finney pregnancy. Number of fetuses: 1.    Dating  ======    LMP on: 9/15/2016  GA by LMP 36 w + 6 d  DALIA by LMP: 6/22/2017  Ultrasound examination on: 5/31/2017  GA by U/S based upon: AC, BPD, Femur, HC  GA by U/S 35 w + 5 d  DALIA by U/S: 6/30/2017  Assigned: The calculation of the gestational age based on CRL.  The Best Overall Assessment is based on the ultrasound examination on 11/09/16.  Assigned GA 35 w + 3 d  Assigned DALIA: 7/2/2017    General Evaluation  ==============    Cardiac activity: present.  bpm.  Fetal movements: visualized.  Presentation: cephalic.  Placenta: anterior.  Umbilical cord: 3 vessel cord.  Amniotic fluid: MVP 4.0 cm.          Biophysical Profile  ==============    2: Fetal breathing movements  2: Gross body movements  2: Fetal tone  2: Amniotic fluid volume  8/8: Biophysical profile score  Interpretation: normal          Fetal Biometry  ============    Fetal Biometry  BPD 87.9 mm 35w 4d Hadlock  .4 mm  .6 mm 37w 2d Hadlock  .4 mm 35w 3d Hadlock  Femur 67.9 mm 34w 6d Hadlock  EFW 2,685 g 50% 35w 3d Hadlock  Calculated by: Hadlock (BPD-HC-AC-FL)  EFW (lb) 5 lb  EFW (oz) 15 oz  Cephalic index 0.75  HC / AC 1.05  FL / BPD 0.77  FL / AC 0.22  MVP 4.0 cm   bpm  Head / Face / Neck   4.7 mm    Fetal Anatomy  ============    Cranium: normal  Stomach: normal  Kidneys: normal  Bladder: normal        Impression  =========    Limited anatomy was negative. No anomalies seen.  AFV normal.  Fetal biometry is consistent and concordant with dating.    Reassuring fetal testing.          Recommendation  ==============    Repeat ultrasound study as clinically indicated.    Thank you  again for allowing us to participate in the care of your patients. If you have any questions concerning today's consultation, feel free  to contact me or one of my partners. We can be reached at [phone] during normal business hours. If you have a question after normal  business hours, please contact Labor and Delivery at [phone].

## 2017-06-09 ENCOUNTER — ROUTINE PRENATAL (OUTPATIENT)
Dept: OBSTETRICS AND GYNECOLOGY | Facility: CLINIC | Age: 37
End: 2017-06-09
Payer: COMMERCIAL

## 2017-06-09 ENCOUNTER — LAB VISIT (OUTPATIENT)
Dept: LAB | Facility: OTHER | Age: 37
End: 2017-06-09
Attending: OBSTETRICS & GYNECOLOGY
Payer: COMMERCIAL

## 2017-06-09 VITALS
WEIGHT: 180.75 LBS | BODY MASS INDEX: 29.18 KG/M2 | SYSTOLIC BLOOD PRESSURE: 120 MMHG | DIASTOLIC BLOOD PRESSURE: 72 MMHG

## 2017-06-09 DIAGNOSIS — O26.12 LOW WEIGHT GAIN DURING PREGNANCY IN SECOND TRIMESTER: ICD-10-CM

## 2017-06-09 DIAGNOSIS — A60.00 HERPES SIMPLEX INFECTION OF GENITOURINARY SYSTEM: ICD-10-CM

## 2017-06-09 DIAGNOSIS — Z3A.34 34 WEEKS GESTATION OF PREGNANCY: ICD-10-CM

## 2017-06-09 DIAGNOSIS — D50.9 IRON DEFICIENCY ANEMIA, UNSPECIFIED IRON DEFICIENCY ANEMIA TYPE: ICD-10-CM

## 2017-06-09 DIAGNOSIS — Z98.84 HISTORY OF GASTRIC BYPASS: ICD-10-CM

## 2017-06-09 DIAGNOSIS — Z98.891 H/O CESAREAN SECTION: ICD-10-CM

## 2017-06-09 DIAGNOSIS — Z3A.36 36 WEEKS GESTATION OF PREGNANCY: Primary | ICD-10-CM

## 2017-06-09 LAB
BASOPHILS # BLD AUTO: 0.02 K/UL
BASOPHILS NFR BLD: 0.3 %
DIFFERENTIAL METHOD: ABNORMAL
EOSINOPHIL # BLD AUTO: 0.1 K/UL
EOSINOPHIL NFR BLD: 0.7 %
ERYTHROCYTE [DISTWIDTH] IN BLOOD BY AUTOMATED COUNT: 13.3 %
HCT VFR BLD AUTO: 30.5 %
HGB BLD-MCNC: 9.8 G/DL
HIV 1+2 AB+HIV1 P24 AG SERPL QL IA: NEGATIVE
LYMPHOCYTES # BLD AUTO: 1.8 K/UL
LYMPHOCYTES NFR BLD: 26.9 %
MCH RBC QN AUTO: 26.3 PG
MCHC RBC AUTO-ENTMCNC: 32.1 %
MCV RBC AUTO: 82 FL
MONOCYTES # BLD AUTO: 0.5 K/UL
MONOCYTES NFR BLD: 7.2 %
NEUTROPHILS # BLD AUTO: 4.4 K/UL
NEUTROPHILS NFR BLD: 64.5 %
PLATELET # BLD AUTO: 255 K/UL
PMV BLD AUTO: 10.6 FL
RBC # BLD AUTO: 3.73 M/UL
RPR SER QL: NORMAL
VIT B12 SERPL-MCNC: <146 PG/ML
WBC # BLD AUTO: 6.85 K/UL

## 2017-06-09 PROCEDURE — 82607 VITAMIN B-12: CPT

## 2017-06-09 PROCEDURE — 86592 SYPHILIS TEST NON-TREP QUAL: CPT

## 2017-06-09 PROCEDURE — 86703 HIV-1/HIV-2 1 RESULT ANTBDY: CPT

## 2017-06-09 PROCEDURE — 36415 COLL VENOUS BLD VENIPUNCTURE: CPT

## 2017-06-09 PROCEDURE — 85025 COMPLETE CBC W/AUTO DIFF WBC: CPT

## 2017-06-09 PROCEDURE — 0502F SUBSEQUENT PRENATAL CARE: CPT | Mod: S$GLB,,, | Performed by: OBSTETRICS & GYNECOLOGY

## 2017-06-09 PROCEDURE — 99999 PR PBB SHADOW E&M-EST. PATIENT-LVL II: CPT | Mod: PBBFAC,,, | Performed by: OBSTETRICS & GYNECOLOGY

## 2017-06-09 RX ORDER — VALACYCLOVIR HYDROCHLORIDE 500 MG/1
500 TABLET, FILM COATED ORAL DAILY
Qty: 30 TABLET | Refills: 12 | Status: SHIPPED | OUTPATIENT
Start: 2017-06-09 | End: 2017-08-09

## 2017-06-12 LAB — BACTERIA SPEC AEROBE CULT: NORMAL

## 2017-06-16 ENCOUNTER — ROUTINE PRENATAL (OUTPATIENT)
Dept: OBSTETRICS AND GYNECOLOGY | Facility: CLINIC | Age: 37
End: 2017-06-16
Payer: COMMERCIAL

## 2017-06-16 VITALS — BODY MASS INDEX: 29.5 KG/M2 | SYSTOLIC BLOOD PRESSURE: 116 MMHG | DIASTOLIC BLOOD PRESSURE: 74 MMHG | WEIGHT: 182.75 LBS

## 2017-06-16 DIAGNOSIS — Z98.891 H/O CESAREAN SECTION: ICD-10-CM

## 2017-06-16 DIAGNOSIS — O26.12 LOW WEIGHT GAIN DURING PREGNANCY IN SECOND TRIMESTER: ICD-10-CM

## 2017-06-16 DIAGNOSIS — D50.8 IRON DEFICIENCY ANEMIA SECONDARY TO INADEQUATE DIETARY IRON INTAKE: ICD-10-CM

## 2017-06-16 DIAGNOSIS — A60.00 HERPES SIMPLEX INFECTION OF GENITOURINARY SYSTEM: ICD-10-CM

## 2017-06-16 DIAGNOSIS — O09.523 AMA (ADVANCED MATERNAL AGE) MULTIGRAVIDA 35+, THIRD TRIMESTER: ICD-10-CM

## 2017-06-16 DIAGNOSIS — E53.8 B12 DEFICIENCY: ICD-10-CM

## 2017-06-16 DIAGNOSIS — Z3A.37 37 WEEKS GESTATION OF PREGNANCY: Primary | ICD-10-CM

## 2017-06-16 PROCEDURE — 99999 PR PBB SHADOW E&M-EST. PATIENT-LVL II: CPT | Mod: PBBFAC,,, | Performed by: OBSTETRICS & GYNECOLOGY

## 2017-06-16 PROCEDURE — 0502F SUBSEQUENT PRENATAL CARE: CPT | Mod: S$GLB,,, | Performed by: OBSTETRICS & GYNECOLOGY

## 2017-06-23 ENCOUNTER — ROUTINE PRENATAL (OUTPATIENT)
Dept: OBSTETRICS AND GYNECOLOGY | Facility: CLINIC | Age: 37
End: 2017-06-23
Payer: COMMERCIAL

## 2017-06-23 VITALS
SYSTOLIC BLOOD PRESSURE: 106 MMHG | BODY MASS INDEX: 29.61 KG/M2 | WEIGHT: 183.44 LBS | DIASTOLIC BLOOD PRESSURE: 80 MMHG

## 2017-06-23 DIAGNOSIS — D50.8 IRON DEFICIENCY ANEMIA SECONDARY TO INADEQUATE DIETARY IRON INTAKE: ICD-10-CM

## 2017-06-23 DIAGNOSIS — Z98.891 H/O CESAREAN SECTION: ICD-10-CM

## 2017-06-23 DIAGNOSIS — O09.523 AMA (ADVANCED MATERNAL AGE) MULTIGRAVIDA 35+, THIRD TRIMESTER: ICD-10-CM

## 2017-06-23 DIAGNOSIS — Z98.84 HISTORY OF GASTRIC BYPASS: ICD-10-CM

## 2017-06-23 DIAGNOSIS — E53.8 B12 DEFICIENCY: ICD-10-CM

## 2017-06-23 DIAGNOSIS — A60.1 HERPES SIMPLEX INFECTION OF PERIANAL SKIN: ICD-10-CM

## 2017-06-23 DIAGNOSIS — Z3A.38 38 WEEKS GESTATION OF PREGNANCY: Primary | ICD-10-CM

## 2017-06-23 DIAGNOSIS — O26.12 LOW WEIGHT GAIN DURING PREGNANCY IN SECOND TRIMESTER: ICD-10-CM

## 2017-06-23 PROCEDURE — 99999 PR PBB SHADOW E&M-EST. PATIENT-LVL I: CPT | Mod: PBBFAC,,, | Performed by: OBSTETRICS & GYNECOLOGY

## 2017-06-23 PROCEDURE — 0502F SUBSEQUENT PRENATAL CARE: CPT | Mod: S$GLB,,, | Performed by: OBSTETRICS & GYNECOLOGY

## 2017-06-26 ENCOUNTER — HOSPITAL ENCOUNTER (INPATIENT)
Facility: OTHER | Age: 37
LOS: 4 days | Discharge: HOME | End: 2017-06-30
Attending: OBSTETRICS & GYNECOLOGY | Admitting: OBSTETRICS & GYNECOLOGY
Payer: COMMERCIAL

## 2017-06-26 ENCOUNTER — ANESTHESIA (OUTPATIENT)
Dept: OBSTETRICS AND GYNECOLOGY | Facility: OTHER | Age: 37
End: 2017-06-26
Payer: COMMERCIAL

## 2017-06-26 ENCOUNTER — SURGERY (OUTPATIENT)
Age: 37
End: 2017-06-26

## 2017-06-26 ENCOUNTER — ANESTHESIA EVENT (OUTPATIENT)
Dept: OBSTETRICS AND GYNECOLOGY | Facility: OTHER | Age: 37
End: 2017-06-26
Payer: COMMERCIAL

## 2017-06-26 DIAGNOSIS — Z98.891 H/O CESAREAN SECTION: Primary | ICD-10-CM

## 2017-06-26 DIAGNOSIS — D64.9 ANEMIA: ICD-10-CM

## 2017-06-26 DIAGNOSIS — Z34.93 PREGNANCY WITH ONE FETUS IN THIRD TRIMESTER: ICD-10-CM

## 2017-06-26 DIAGNOSIS — R55 SYNCOPE: ICD-10-CM

## 2017-06-26 DIAGNOSIS — Z98.891 S/P C-SECTION: ICD-10-CM

## 2017-06-26 LAB
ABO + RH BLD: NORMAL
ALLENS TEST: ABNORMAL
BASOPHILS # BLD AUTO: 0.01 K/UL
BASOPHILS # BLD AUTO: 0.02 K/UL
BASOPHILS NFR BLD: 0.1 %
BASOPHILS NFR BLD: 0.3 %
BLD GP AB SCN CELLS X3 SERPL QL: NORMAL
DIFFERENTIAL METHOD: ABNORMAL
DIFFERENTIAL METHOD: ABNORMAL
EOSINOPHIL # BLD AUTO: 0 K/UL
EOSINOPHIL # BLD AUTO: 0.1 K/UL
EOSINOPHIL NFR BLD: 0.7 %
EOSINOPHIL NFR BLD: 0.8 %
ERYTHROCYTE [DISTWIDTH] IN BLOOD BY AUTOMATED COUNT: 14 %
ERYTHROCYTE [DISTWIDTH] IN BLOOD BY AUTOMATED COUNT: 14.1 %
HCO3 UR-SCNC: 23 MMOL/L (ref 24–28)
HCT VFR BLD AUTO: 24.3 %
HCT VFR BLD AUTO: 27.7 %
HGB BLD-MCNC: 7.8 G/DL
HGB BLD-MCNC: 9 G/DL
LYMPHOCYTES # BLD AUTO: 1.5 K/UL
LYMPHOCYTES # BLD AUTO: 1.7 K/UL
LYMPHOCYTES NFR BLD: 18.6 %
LYMPHOCYTES NFR BLD: 26.1 %
MCH RBC QN AUTO: 25.5 PG
MCH RBC QN AUTO: 25.9 PG
MCHC RBC AUTO-ENTMCNC: 32.1 %
MCHC RBC AUTO-ENTMCNC: 32.5 %
MCV RBC AUTO: 79 FL
MCV RBC AUTO: 80 FL
MONOCYTES # BLD AUTO: 0.5 K/UL
MONOCYTES # BLD AUTO: 0.5 K/UL
MONOCYTES NFR BLD: 5.9 %
MONOCYTES NFR BLD: 8 %
NEUTROPHILS # BLD AUTO: 3.8 K/UL
NEUTROPHILS # BLD AUTO: 6.6 K/UL
NEUTROPHILS NFR BLD: 64.7 %
NEUTROPHILS NFR BLD: 74.4 %
PCO2 BLDA: 48.1 MMHG (ref 35–45)
PH SMN: 7.29 [PH] (ref 7.35–7.45)
PLATELET # BLD AUTO: 218 K/UL
PLATELET # BLD AUTO: 258 K/UL
PMV BLD AUTO: 10.3 FL
PMV BLD AUTO: 9.9 FL
PO2 BLDA: 20 MMHG (ref 80–100)
POC BE: -4 MMOL/L
POC SATURATED O2: 27 % (ref 95–100)
RBC # BLD AUTO: 3.06 M/UL
RBC # BLD AUTO: 3.48 M/UL
SAMPLE: ABNORMAL
SITE: ABNORMAL
WBC # BLD AUTO: 5.9 K/UL
WBC # BLD AUTO: 8.87 K/UL

## 2017-06-26 PROCEDURE — 27800516 HC TRAY, EPIDURAL COMBO: Performed by: ANESTHESIOLOGY

## 2017-06-26 PROCEDURE — 11000001 HC ACUTE MED/SURG PRIVATE ROOM

## 2017-06-26 PROCEDURE — 25000003 PHARM REV CODE 250: Performed by: ANESTHESIOLOGY

## 2017-06-26 PROCEDURE — 27200688 HC TRAY, SPINAL-HYPER/ ISOBARIC: Performed by: ANESTHESIOLOGY

## 2017-06-26 PROCEDURE — 86900 BLOOD TYPING SEROLOGIC ABO: CPT

## 2017-06-26 PROCEDURE — 63600175 PHARM REV CODE 636 W HCPCS: Performed by: ANESTHESIOLOGY

## 2017-06-26 PROCEDURE — 99900035 HC TECH TIME PER 15 MIN (STAT)

## 2017-06-26 PROCEDURE — 51702 INSERT TEMP BLADDER CATH: CPT

## 2017-06-26 PROCEDURE — 25000003 PHARM REV CODE 250: Performed by: STUDENT IN AN ORGANIZED HEALTH CARE EDUCATION/TRAINING PROGRAM

## 2017-06-26 PROCEDURE — 63600175 PHARM REV CODE 636 W HCPCS: Performed by: STUDENT IN AN ORGANIZED HEALTH CARE EDUCATION/TRAINING PROGRAM

## 2017-06-26 PROCEDURE — 85025 COMPLETE CBC W/AUTO DIFF WBC: CPT

## 2017-06-26 PROCEDURE — 37000009 HC ANESTHESIA EA ADD 15 MINS: Performed by: OBSTETRICS & GYNECOLOGY

## 2017-06-26 PROCEDURE — 59510 CESAREAN DELIVERY: CPT | Mod: ,,, | Performed by: ANESTHESIOLOGY

## 2017-06-26 PROCEDURE — 36415 COLL VENOUS BLD VENIPUNCTURE: CPT

## 2017-06-26 PROCEDURE — 37000008 HC ANESTHESIA 1ST 15 MINUTES: Performed by: OBSTETRICS & GYNECOLOGY

## 2017-06-26 PROCEDURE — 86850 RBC ANTIBODY SCREEN: CPT

## 2017-06-26 PROCEDURE — 36000685 HC CESAREAN SECTION LEVEL I

## 2017-06-26 PROCEDURE — 82803 BLOOD GASES ANY COMBINATION: CPT

## 2017-06-26 RX ORDER — CARBOPROST TROMETHAMINE 250 UG/ML
INJECTION, SOLUTION INTRAMUSCULAR
Status: DISCONTINUED
Start: 2017-06-26 | End: 2017-06-26 | Stop reason: WASHOUT

## 2017-06-26 RX ORDER — OXYCODONE AND ACETAMINOPHEN 5; 325 MG/1; MG/1
1 TABLET ORAL EVERY 4 HOURS PRN
Status: DISCONTINUED | OUTPATIENT
Start: 2017-06-27 | End: 2017-06-29

## 2017-06-26 RX ORDER — FENTANYL CITRATE 50 UG/ML
INJECTION, SOLUTION INTRAMUSCULAR; INTRAVENOUS
Status: DISCONTINUED | OUTPATIENT
Start: 2017-06-26 | End: 2017-06-26

## 2017-06-26 RX ORDER — SODIUM CITRATE AND CITRIC ACID MONOHYDRATE 334; 500 MG/5ML; MG/5ML
30 SOLUTION ORAL
Status: DISCONTINUED | OUTPATIENT
Start: 2017-06-26 | End: 2017-06-30 | Stop reason: HOSPADM

## 2017-06-26 RX ORDER — DIPHENHYDRAMINE HYDROCHLORIDE 50 MG/ML
25 INJECTION INTRAMUSCULAR; INTRAVENOUS EVERY 4 HOURS PRN
Status: DISCONTINUED | OUTPATIENT
Start: 2017-06-26 | End: 2017-06-30 | Stop reason: HOSPADM

## 2017-06-26 RX ORDER — DIPHENHYDRAMINE HCL 25 MG
25 CAPSULE ORAL EVERY 4 HOURS PRN
Status: DISCONTINUED | OUTPATIENT
Start: 2017-06-26 | End: 2017-06-30 | Stop reason: HOSPADM

## 2017-06-26 RX ORDER — KETOROLAC TROMETHAMINE 30 MG/ML
INJECTION, SOLUTION INTRAMUSCULAR; INTRAVENOUS
Status: DISCONTINUED | OUTPATIENT
Start: 2017-06-26 | End: 2017-06-26

## 2017-06-26 RX ORDER — OXYTOCIN/RINGER'S LACTATE 20/1000 ML
41.65 PLASTIC BAG, INJECTION (ML) INTRAVENOUS CONTINUOUS
Status: ACTIVE | OUTPATIENT
Start: 2017-06-26 | End: 2017-06-26

## 2017-06-26 RX ORDER — SODIUM CHLORIDE, SODIUM LACTATE, POTASSIUM CHLORIDE, CALCIUM CHLORIDE 600; 310; 30; 20 MG/100ML; MG/100ML; MG/100ML; MG/100ML
INJECTION, SOLUTION INTRAVENOUS CONTINUOUS PRN
Status: DISCONTINUED | OUTPATIENT
Start: 2017-06-26 | End: 2017-06-26

## 2017-06-26 RX ORDER — MISOPROSTOL 200 UG/1
800 TABLET ORAL
Status: DISCONTINUED | OUTPATIENT
Start: 2017-06-26 | End: 2017-06-30 | Stop reason: HOSPADM

## 2017-06-26 RX ORDER — ONDANSETRON 2 MG/ML
4 INJECTION INTRAMUSCULAR; INTRAVENOUS ONCE
Status: COMPLETED | OUTPATIENT
Start: 2017-06-26 | End: 2017-06-26

## 2017-06-26 RX ORDER — OXYCODONE AND ACETAMINOPHEN 10; 325 MG/1; MG/1
1 TABLET ORAL EVERY 4 HOURS PRN
Status: DISCONTINUED | OUTPATIENT
Start: 2017-06-27 | End: 2017-06-29

## 2017-06-26 RX ORDER — FAMOTIDINE 10 MG/ML
20 INJECTION INTRAVENOUS
Status: DISCONTINUED | OUTPATIENT
Start: 2017-06-26 | End: 2017-06-30 | Stop reason: HOSPADM

## 2017-06-26 RX ORDER — OXYTOCIN 10 [USP'U]/ML
INJECTION, SOLUTION INTRAMUSCULAR; INTRAVENOUS
Status: DISCONTINUED
Start: 2017-06-26 | End: 2017-06-26 | Stop reason: WASHOUT

## 2017-06-26 RX ORDER — DOCUSATE SODIUM 100 MG/1
200 CAPSULE, LIQUID FILLED ORAL 2 TIMES DAILY
Status: DISCONTINUED | OUTPATIENT
Start: 2017-06-26 | End: 2017-06-28

## 2017-06-26 RX ORDER — OXYCODONE HYDROCHLORIDE 5 MG/1
5 TABLET ORAL EVERY 4 HOURS PRN
Status: DISPENSED | OUTPATIENT
Start: 2017-06-26 | End: 2017-06-27

## 2017-06-26 RX ORDER — ONDANSETRON 2 MG/ML
4 INJECTION INTRAMUSCULAR; INTRAVENOUS EVERY 8 HOURS PRN
Status: DISCONTINUED | OUTPATIENT
Start: 2017-06-26 | End: 2017-06-30 | Stop reason: HOSPADM

## 2017-06-26 RX ORDER — BISACODYL 10 MG
10 SUPPOSITORY, RECTAL RECTAL ONCE AS NEEDED
Status: ACTIVE | OUTPATIENT
Start: 2017-06-26 | End: 2017-06-26

## 2017-06-26 RX ORDER — OXYCODONE HYDROCHLORIDE 5 MG/1
10 TABLET ORAL EVERY 4 HOURS PRN
Status: ACTIVE | OUTPATIENT
Start: 2017-06-26 | End: 2017-06-27

## 2017-06-26 RX ORDER — IBUPROFEN 600 MG/1
600 TABLET ORAL EVERY 6 HOURS
Status: DISCONTINUED | OUTPATIENT
Start: 2017-06-27 | End: 2017-06-30 | Stop reason: HOSPADM

## 2017-06-26 RX ORDER — MORPHINE SULFATE 0.5 MG/ML
INJECTION, SOLUTION EPIDURAL; INTRATHECAL; INTRAVENOUS
Status: DISCONTINUED | OUTPATIENT
Start: 2017-06-26 | End: 2017-06-26

## 2017-06-26 RX ORDER — ADHESIVE BANDAGE
30 BANDAGE TOPICAL 2 TIMES DAILY PRN
Status: DISCONTINUED | OUTPATIENT
Start: 2017-06-27 | End: 2017-06-30 | Stop reason: HOSPADM

## 2017-06-26 RX ORDER — METHYLERGONOVINE MALEATE 0.2 MG/ML
INJECTION INTRAVENOUS
Status: DISCONTINUED
Start: 2017-06-26 | End: 2017-06-26 | Stop reason: WASHOUT

## 2017-06-26 RX ORDER — SIMETHICONE 80 MG
1 TABLET,CHEWABLE ORAL EVERY 6 HOURS PRN
Status: DISCONTINUED | OUTPATIENT
Start: 2017-06-26 | End: 2017-06-30 | Stop reason: HOSPADM

## 2017-06-26 RX ORDER — HYDROCORTISONE 25 MG/G
CREAM TOPICAL 3 TIMES DAILY PRN
Status: DISCONTINUED | OUTPATIENT
Start: 2017-06-26 | End: 2017-06-30 | Stop reason: HOSPADM

## 2017-06-26 RX ORDER — BUPIVACAINE HYDROCHLORIDE 7.5 MG/ML
INJECTION, SOLUTION INTRASPINAL
Status: DISCONTINUED | OUTPATIENT
Start: 2017-06-26 | End: 2017-06-26

## 2017-06-26 RX ORDER — GLYCOPYRROLATE 0.2 MG/ML
INJECTION INTRAMUSCULAR; INTRAVENOUS
Status: DISCONTINUED | OUTPATIENT
Start: 2017-06-26 | End: 2017-06-26

## 2017-06-26 RX ORDER — MISOPROSTOL 200 UG/1
TABLET ORAL
Status: DISCONTINUED
Start: 2017-06-26 | End: 2017-06-26 | Stop reason: WASHOUT

## 2017-06-26 RX ORDER — KETOROLAC TROMETHAMINE 30 MG/ML
30 INJECTION, SOLUTION INTRAMUSCULAR; INTRAVENOUS EVERY 6 HOURS
Status: COMPLETED | OUTPATIENT
Start: 2017-06-26 | End: 2017-06-27

## 2017-06-26 RX ORDER — ACETAMINOPHEN 10 MG/ML
INJECTION, SOLUTION INTRAVENOUS
Status: DISCONTINUED | OUTPATIENT
Start: 2017-06-26 | End: 2017-06-26

## 2017-06-26 RX ORDER — CEFAZOLIN SODIUM 2 G/50ML
2 SOLUTION INTRAVENOUS
Status: DISCONTINUED | OUTPATIENT
Start: 2017-06-26 | End: 2017-06-30 | Stop reason: HOSPADM

## 2017-06-26 RX ORDER — AMOXICILLIN 250 MG
1 CAPSULE ORAL NIGHTLY PRN
Status: DISCONTINUED | OUTPATIENT
Start: 2017-06-26 | End: 2017-06-30 | Stop reason: HOSPADM

## 2017-06-26 RX ORDER — ONDANSETRON 8 MG/1
8 TABLET, ORALLY DISINTEGRATING ORAL EVERY 8 HOURS PRN
Status: CANCELLED | OUTPATIENT
Start: 2017-06-26

## 2017-06-26 RX ORDER — ZOLPIDEM TARTRATE 5 MG/1
5 TABLET ORAL NIGHTLY PRN
Status: DISCONTINUED | OUTPATIENT
Start: 2017-06-26 | End: 2017-06-30 | Stop reason: HOSPADM

## 2017-06-26 RX ORDER — ONDANSETRON HYDROCHLORIDE 2 MG/ML
INJECTION, SOLUTION INTRAMUSCULAR; INTRAVENOUS
Status: DISCONTINUED | OUTPATIENT
Start: 2017-06-26 | End: 2017-06-26

## 2017-06-26 RX ORDER — SODIUM CHLORIDE, SODIUM LACTATE, POTASSIUM CHLORIDE, CALCIUM CHLORIDE 600; 310; 30; 20 MG/100ML; MG/100ML; MG/100ML; MG/100ML
INJECTION, SOLUTION INTRAVENOUS CONTINUOUS
Status: DISCONTINUED | OUTPATIENT
Start: 2017-06-26 | End: 2017-06-30 | Stop reason: HOSPADM

## 2017-06-26 RX ORDER — SODIUM CHLORIDE, SODIUM LACTATE, POTASSIUM CHLORIDE, CALCIUM CHLORIDE 600; 310; 30; 20 MG/100ML; MG/100ML; MG/100ML; MG/100ML
INJECTION, SOLUTION INTRAVENOUS CONTINUOUS
Status: ACTIVE | OUTPATIENT
Start: 2017-06-26 | End: 2017-06-27

## 2017-06-26 RX ORDER — PHENYLEPHRINE HYDROCHLORIDE 10 MG/ML
INJECTION INTRAVENOUS
Status: DISCONTINUED | OUTPATIENT
Start: 2017-06-26 | End: 2017-06-26

## 2017-06-26 RX ORDER — OXYTOCIN 10 [USP'U]/ML
INJECTION, SOLUTION INTRAMUSCULAR; INTRAVENOUS
Status: DISCONTINUED | OUTPATIENT
Start: 2017-06-26 | End: 2017-06-26

## 2017-06-26 RX ORDER — ACETAMINOPHEN 325 MG/1
650 TABLET ORAL
Status: COMPLETED | OUTPATIENT
Start: 2017-06-26 | End: 2017-06-27

## 2017-06-26 RX ORDER — ACETAMINOPHEN 325 MG/1
650 TABLET ORAL EVERY 6 HOURS PRN
Status: DISCONTINUED | OUTPATIENT
Start: 2017-06-26 | End: 2017-06-30 | Stop reason: HOSPADM

## 2017-06-26 RX ADMIN — ACETAMINOPHEN 650 MG: 325 TABLET ORAL at 02:06

## 2017-06-26 RX ADMIN — Medication 0.15 MG: at 08:06

## 2017-06-26 RX ADMIN — SODIUM CHLORIDE, SODIUM LACTATE, POTASSIUM CHLORIDE, AND CALCIUM CHLORIDE: 600; 310; 30; 20 INJECTION, SOLUTION INTRAVENOUS at 08:06

## 2017-06-26 RX ADMIN — OXYTOCIN 2 UNITS: 10 INJECTION, SOLUTION INTRAMUSCULAR; INTRAVENOUS at 08:06

## 2017-06-26 RX ADMIN — KETOROLAC TROMETHAMINE 30 MG: 30 INJECTION, SOLUTION INTRAMUSCULAR at 08:06

## 2017-06-26 RX ADMIN — ACETAMINOPHEN 1000 MG: 10 INJECTION, SOLUTION INTRAVENOUS at 08:06

## 2017-06-26 RX ADMIN — ONDANSETRON 4 MG: 2 INJECTION INTRAMUSCULAR; INTRAVENOUS at 04:06

## 2017-06-26 RX ADMIN — GLYCOPYRROLATE 0.2 MG: 0.2 INJECTION, SOLUTION INTRAMUSCULAR; INTRAVENOUS at 08:06

## 2017-06-26 RX ADMIN — EPHEDRINE SULFATE 5 MG: 50 INJECTION INTRAMUSCULAR; INTRAVENOUS; SUBCUTANEOUS at 08:06

## 2017-06-26 RX ADMIN — DEXTROSE 2 G: 50 INJECTION, SOLUTION INTRAVENOUS at 08:06

## 2017-06-26 RX ADMIN — ACETAMINOPHEN 650 MG: 325 TABLET ORAL at 08:06

## 2017-06-26 RX ADMIN — FAMOTIDINE 20 MG: 10 INJECTION INTRAVENOUS at 07:06

## 2017-06-26 RX ADMIN — PHENYLEPHRINE HYDROCHLORIDE 100 MCG: 10 INJECTION INTRAVENOUS at 08:06

## 2017-06-26 RX ADMIN — SODIUM CITRATE AND CITRIC ACID MONOHYDRATE 30 ML: 500; 334 SOLUTION ORAL at 07:06

## 2017-06-26 RX ADMIN — PROMETHAZINE HYDROCHLORIDE 6.25 MG: 25 INJECTION INTRAMUSCULAR; INTRAVENOUS at 07:06

## 2017-06-26 RX ADMIN — ONDANSETRON 4 MG: 2 INJECTION, SOLUTION INTRAMUSCULAR; INTRAVENOUS at 08:06

## 2017-06-26 RX ADMIN — GLYCOPYRROLATE 0.2 MG: 0.2 INJECTION, SOLUTION INTRAMUSCULAR; INTRAVENOUS at 09:06

## 2017-06-26 RX ADMIN — GLYCOPYRROLATE 0.2 MG: 0.2 INJECTION, SOLUTION INTRAMUSCULAR; INTRAVENOUS at 06:06

## 2017-06-26 RX ADMIN — KETOROLAC TROMETHAMINE 30 MG: 30 INJECTION, SOLUTION INTRAMUSCULAR at 02:06

## 2017-06-26 RX ADMIN — SODIUM CHLORIDE, SODIUM LACTATE, POTASSIUM CHLORIDE, AND CALCIUM CHLORIDE 500 ML: .6; .31; .03; .02 INJECTION, SOLUTION INTRAVENOUS at 06:06

## 2017-06-26 RX ADMIN — KETOROLAC TROMETHAMINE 30 MG: 30 INJECTION, SOLUTION INTRAMUSCULAR; INTRAVENOUS at 08:06

## 2017-06-26 RX ADMIN — FENTANYL CITRATE 10 MCG: 50 INJECTION, SOLUTION INTRAMUSCULAR; INTRAVENOUS at 08:06

## 2017-06-26 RX ADMIN — BUPIVACAINE HYDROCHLORIDE IN DEXTROSE 1.6 ML: 7.5 INJECTION, SOLUTION SUBARACHNOID at 08:06

## 2017-06-26 RX ADMIN — OXYCODONE HYDROCHLORIDE 5 MG: 5 TABLET ORAL at 01:06

## 2017-06-26 RX ADMIN — PHENYLEPHRINE HYDROCHLORIDE 200 MCG: 10 INJECTION INTRAVENOUS at 08:06

## 2017-06-26 RX ADMIN — DOCUSATE SODIUM 200 MG: 100 CAPSULE, LIQUID FILLED ORAL at 08:06

## 2017-06-26 NOTE — PLAN OF CARE
Problem: Patient Care Overview  Goal: Plan of Care Review  Outcome: Ongoing (interventions implemented as appropriate)  VSS. Pt states good pain control with IV medications. Fundus firm and midline. Lochia light. Urine output adequate. Breast pumping initiated for infant in NICU. Pt transported to MBU via stretcher. Report given to IRENE Lima RN.

## 2017-06-26 NOTE — PROGRESS NOTES
Patient would like to visit infant in NICU. Patient denies pain, nausea, light-headedness or dizziness. Patient's vaginal bleeding is scant; fundus is firm and midline. Patient was able to stand and ambulate to wheelchair with assistance x2 RNs. Patient tolerated activity well. Patient and family verbalize understanding that patient is not to stand up without RN assistance. RN phone number written on paper and given to patient. Patient instructed to call RN or have NICU RN call MBU RN if needed. Patient escorted off unit via wheelchair by  and family.

## 2017-06-26 NOTE — PLAN OF CARE
Problem: Patient Care Overview  Goal: Plan of Care Review  Outcome: Ongoing (interventions implemented as appropriate)  Lactation Note: Pumping guidelines reviewed with patient and developed plan of care. Patient encouraged to pump 8-10 times/24 hours x 15-20 minutes on preemie plus mode. Proper assembly and cleaning of pump kit and labeling,storage, and transport of breast milk reviewed. Proper flange fit and suction level discussed. Lactation folder provided. Patient to call Lactation for assistance or questions as needed and given Lactation number.

## 2017-06-26 NOTE — ANESTHESIA PROCEDURE NOTES
CSE    Patient location during procedure: OR  Start time: 6/26/2017 8:09 AM  Timeout: 6/26/2017 8:08 AM  End time: 6/26/2017 8:12 AM  Staffing  Anesthesiologist: CORIE NOVOA  Resident/CRNA: AMADA PEDRO  Performed: resident/CRNA   Preanesthetic Checklist  Completed: patient identified, site marked, surgical consent, pre-op evaluation, timeout performed, IV checked, risks and benefits discussed and monitors and equipment checked  CSE  Patient position: sitting  Prep: ChloraPrep  Patient monitoring: heart rate, continuous pulse ox and frequent blood pressure checks  Approach: midline  Spinal Needle  Needle type: Quincke   Needle gauge: 25 G  Needle length: 3.5 in  Epidural Needle  Injection technique: WALLY saline  Needle type: Tuohy   Needle gauge: 17 G  Needle length: 3.5 in  Needle insertion depth: 5 cm  Location: L4-5  Needle localization: anatomical landmarks  Catheter  Catheter type: springw[employer] Materials  Catheter size: 19 G  Catheter at skin depth: 9 cm  Assessment  Sensory level: T5   Dermatomal levels determined by pinch or prick  Intrathecal Medications:  Bolus administered: 1.6 mL of 0.75 and with dextrose bupivacaine  administered: primary anesthetic and 160 mcg of  fentanyl and morphine  Epinephrine added: none  Additional Notes  10 mcg of fentanyl and 150 mcg of duramorph injected intrathecally.

## 2017-06-26 NOTE — H&P
[facility]  Obstetrics  History & Physical    Patient Name: Silvia Bustamante  MRN: [id number]  Admission Date: (Not on file)  Primary Care Provider: Primary Doctor No    Subjective:     Principal Problem: IUP at 39w1d    History of Present Illness:  Silvia Bustamante is a 36 y.o. [id number] at 39w1d presents for repeat  section given history of  delivery (pre term twins) and IUP at 39w1d.   This IUP is complicated by genital HSV, AMA, hx of gastric bypass, anemia and B12 deficiency.  Patient denies contractions, denies vaginal bleeding, denies LOF.   Fetal Movement: normal.     Obstetric HPI:  Patient reports irregular and infrequent contractions, active fetal movement, No vaginal bleeding , No loss of fluid     This pregnancy has been complicated by AMA, HSV positive status, hx of gastric bypass, anemia, B12 deficiency, hx of c/s x1.    Obstetric History       T0      L2     SAB0   TAB0   Ectopic0   Multiple1   Live Births2       # Outcome Date GA Lbr Edu/2nd Weight Sex Delivery Anes PTL Lv   2 Current            1A  14 35w4d  2.07 kg (4 lb 9 oz) F CS-LTranv EPI, Spinal  FUNMI      Name: LUKE BUSTAMANTE,BABY GIRL 1      Apgar1:  8                Apgar5: 8   1B  14 35w4d  2.56 kg (5 lb 10.3 oz) M CS-LTranv EPI, Spinal  FUNMI      Name: LUKE BUSTAMANTE,BABY BOY 2      Apgar1:  8                Apgar5: 9        Past Medical History:   Diagnosis Date    Herpes simplex without mention of complication      Past Surgical History:   Procedure Laterality Date    APPENDECTOMY      corrective surgery for adhesion due to the appendectomy     SECTION  2014    X 1---KJB    GASTRIC BYPASS  2013    AFTER HER BAND BROKE    HERNIA REPAIR      x2    LAPAROSCOPIC GASTRIC BANDING  2009    MOUTH SURGERY      Plastic surgery on chin           (Not in a hospital admission)    Review of patient's allergies indicates:   Allergen Reactions    Sulfa (sulfonamide  antibiotics) Nausea And Vomiting    Lortab asa [hydrocodone-aspirin]      Liquid only        Family History     Problem Relation (Age of Onset)    Colon cancer Paternal Grandfather    Diabetes Maternal Grandmother    Hypertension Maternal Grandmother, Paternal Grandfather, Paternal Grandmother, Maternal Grandfather, Father, Mother, Brother        Social History Main Topics    Smoking status: Never Smoker    Smokeless tobacco: Not on file    Alcohol use No      Comment: Socially ( pre pregnancy)    Drug use: No    Sexual activity: Yes     Partners: Male     Birth control/ protection: None     Review of Systems   Constitutional: Negative for diaphoresis.   HENT: Negative for mouth sores.    Respiratory: Negative for cough.    Cardiovascular: Negative for chest pain, palpitations and leg swelling.   Gastrointestinal: Negative for abdominal pain.   Genitourinary: Negative for vaginal bleeding, vaginal discharge, vaginal pain and vaginal odor.   Neurological: Negative for headaches.   All other systems reviewed and are negative.     Objective:     Vital Signs (Most Recent):    Vital Signs (24h Range):           There is no height or weight on file to calculate BMI.    FHT: Cat 1 (reassuring)  TOCO: none    Physical Exam:   Constitutional: She appears well-developed and well-nourished.    HENT:   Head: Normocephalic.   Right Ear: External ear normal.   Left Ear: External ear normal.      Cardiovascular: Normal rate, regular rhythm and normal heart sounds.     Pulmonary/Chest: Effort normal and breath sounds normal. No respiratory distress. She has no wheezes.        Abdominal: Soft. Bowel sounds are normal. She exhibits distension (gravid). She exhibits no abdominal incision.     Genitourinary:   Genitourinary Comments:   SSE: Normal external female genitalia, normal urethral meatus, normal urethra, normal vaginal rugae, normal vaginal mucosa, no vaginal blood in canal, MODERATE AMOUNT OF THIN WHITE DISCHARGE IN  VAGINAL CANAL, NO EVIDENCE OF HSV LESIONS                   Psychiatric: She has a normal mood and affect. Her behavior is normal. Judgment and thought content normal.       Cervix:  DEFERRED     Significant Labs:  Lab Results   Component Value Date    GROUPTRH O POS 2016    HEPBSAG Negative 2016    STREPBCULT No Group B Streptococcus isolated 2017       I have personallly reviewed all pertinent lab results from the last 24 hours.    Assessment/Plan:     36 y.o. female  at 39w1d for:    Anemia---HCT 30    - Pre op CBC pending. Blood transfusion consents discussed and signed. Stable.         H/O  section X 1    - Consents signed and to chart  - Admit to Labor and Delivery unit  - Epidural per Anesthesia  - Draw CBC, T&S  - Ancef OCTOR  - To OR for C/S. Case Request is in.   - Notify Staff  - Post-Partum Hemorrhage risk - low              AMA (advanced maternal age) multigravida 35+    - Stable. No active issues.         Genital HSV    - No evidence of lesions on exam.             Thierry Lazcano MD  Obstetrics  [facility]      Ralf Foster MD

## 2017-06-26 NOTE — PROGRESS NOTES
Dr. Miller called and notified of patient's heart rate (50-55bpm). Reassessed patient's heart rate at this time; heart rate ranges between 47-51bpm. O2 sats 100% on room. BP WDL. Patient reports complaints of lightheadedness and nausea at this time. Patient is s/p zofran and refuses phenergan. Per MD, administer a 500 cc bolus of LR at this time.

## 2017-06-26 NOTE — TRANSFER OF CARE
"Anesthesia Transfer of Care Note    Patient: Silvia Bustamante    Procedure(s) Performed: Procedure(s) (LRB):  DELIVERY- SECTION (N/A)    Patient location: Labor and Delivery    Anesthesia Type: CSE    Transport from OR: Transported from OR on room air with adequate spontaneous ventilation    Post pain: adequate analgesia    Post assessment: no apparent anesthetic complications    Post vital signs: stable    Level of consciousness: awake, oriented and alert    Nausea/Vomiting: no nausea/vomiting    Complications: none    Transfer of care protocol was followed      Last vitals:   Visit Vitals  /76   Pulse (!) 56   Temp 35.8 °C (96.5 °F) (Temporal)   Resp 16   Ht 5' 3.5" (1.613 m)   Wt 83 kg (183 lb)   LMP 2016   SpO2 99%   Breastfeeding? No   BMI 31.91 kg/m²     "

## 2017-06-26 NOTE — L&D DELIVERY NOTE
Section Procedure Note    Indications: Ms. Bustamante is a 36 [id number] female with IUP 39w1d weeks' gestational age who presents for repeat  delivery.     Pre-operative Diagnosis:   1. IUP at 39w1d  2. Hx of  section  3. AMA    Post-operative Diagnosis: same, s/p repeat low transverse  section    Procedure: Repeat Low Transverse  Section    Surgeon: Ralf Foster MD    Assistants: Thierry Lazcano MD    Anesthesia: Spinal anesthesia    Findings: Single, viable, male fetus    Estimated Blood Loss:  500           Total IV Fluids: See anesthesia note    UOP: See anesthesia note    Specimens:   1. Placenta                  Complications:  None; patient tolerated the procedure well.           Disposition: Recovery           Condition: stable    Procedure Details   The patient was seen in the Holding Room. The risks, benefits, complications, treatment options, and expected outcomes were discussed with the patient.  The patient concurred with the proposed plan, giving informed consent.    The patient was taken to Operating Room, identified as Silvia Bustamante, birthdate and  procedure were verified.  A Time Out was held and the above information confirmed.    After anesthesia spinal was found to be adequate, the patient was prepped and draped in the usual sterile fashion in the dorsal supine position with a leftward tilt.    A pfannenstiel incision was made in the skin and carried  through the underlying subcutaneous tissue to the level of the  fascia. The fascia was scored at the midline with the inside knife. The fascial incision was then extended transversely using the curved evans scissors. The superior aspect of the fascia was grasped with [facility] clamps x 2 and  from the underlying rectus tissue superiorly and with the help of the curved evans scissors. Attention was then turned to the inferior aspect of the fascial incision which was grasped and  from the underlying  rectus muscle in a similar fashion.  The peritoneum was identified, found to be free of adherent bowl and entered bluntly. The peritoneal incision was extended horizontally with blunt retraction.   The vesico-uterine peritoneum was then identified and bladder blade was inserted. The vesico-uterine peritoneum was grasped, tented away from the underlying uterus and entered sharply with the Metzenbaum scissors.  The incision was extended  transversely and the bladder flap was bluntly freed from the lower uterine segment. The bladder blade was reinserted to keep the bladder out of the operative field.   A low transverse uterine incision was made with knife and extended vertically with finger fracture. The amniotic sac was then entered spontaneously and noted to be Clear.   Infant was noted to be in cephalic position.  The patient delivered a single viable male infant with Apgar scores of 7/7 at one and five minutes respectively was delivered and handed off to the waiting Transition staff.  The umbilical cord was clamped and cut cord blood was obtained for evaluation.   The placenta was removed intact and appeared normal. The uterus was exteriorized. The uterus, tubes and ovaries were examined and appeared normal. The uterine incision was closed with running locked sutures of 2-0 chromic gut. Hemostasis was observed. The uterus was returned to the abdominal cavity. Incision was reinspected and good hemostasis was noted.   The abdominal cavity was then copiously irrigated and cleared of all clots. The muscle was reapproximated with 2-0 chromic gut. The fascia was then reapproximated with running sutures of #1 Looped PDS. The skin was reapproximated with 4-0 monocryl.    Sponge, lap and needle counts were correct x 2 prior the abdominal closure and at the conclusion of the case.         Attending Attestation: I was present and scrubbed for the entire procedure.      Delivery Information for  Jer Bustamante    Birth  information:  YOB: 2017   Time of birth: 8:38 AM   Sex: male   Head Delivery Date/Time: 2017  8:38 AM   Delivery type: , Low Transverse   Gestational Age: 39w1d    Delivery Providers    Delivering clinician:  Ralf Foster Jr., MD   Other personnel:   Provider Role   Thierry Lazcano MD Resident   Adriana Malin, RN Registered Nurse   Samina CABRERA [facility] Tech                   [place] Assessment    Living status:  Living  Apgars:     1 Minute:   5 Minute:   10 Minute:   15 Minute:   20 Minute:     Skin Color:   0  0       Heart Rate:   2  2       Reflex Irritability:   2  2       Muscle Tone:   2  2       Respiratory Effort:   1  1       Total:   7  7               Apgars Assigned By:  ROSA HOLBROOK RN         Assisted Delivery Details:    Forceps attempted?:  No  Vacuum extractor attempted?:  No         Shoulder Dystocia    Shoulder dystocia present?:  No           Presentation and Position    Presentation:   Vertex   Position:                   Interventions/Resuscitation    Method:  Bulb Suctioning, NICU Attended, Deep Suctioning, Tactile Stimulation       Cord    Vessels:  3 vessels  Complications:  None  Delayed Cord Clamping?:  No  Cord Clamped Date/Time:  2017  8:38 AM  Cord Blood Disposition:  Sent with Baby  Gases Sent?:  Yes       Placenta    Date and time:  2017  8:40 AM  Removal:  Manual removal  Appearance:  Intact  Placenta disposition:  discarded           Labor Events:       labor: No     Labor Onset Date/Time:         Dilation Complete Date/Time:         Start Pushing Date/Time:       Rupture Date/Time: 17            Rupture type:           Fluid Amount:        Fluid Color:        Fluid Odor:        Membrane Status (PeriCalm):        Rupture Date/Time (PeriCalm):        Fluid Amount (PeriCalm):        Fluid Color (PeriCalm):         steroids: Unknown     Antibiotics given for GBS: No     Induction:       Indications for  induction:        Augmentation:       Indications for augmentation:       Labor complications: None     Additional complications:          Cervical ripening:                     Delivery:      Episiotomy: None     Indication for Episiotomy:       Perineal Lacerations: None Repaired:      Periurethral Laceration: none Repaired:     Labial Laceration: none Repaired:     Sulcus Laceration: none Repaired:     Vaginal Laceration: No Repaired:     Cervical Laceration: No Repaired:     Repair suture:       Repair # of packets:       Vaginal delivery QBL (mL):        QBL (mL): 495     Combined Blood Loss (mL): 495     Vaginal Sweep Performed:       Surgicount Correct: Yes       Other providers:       Anesthesia    Method:  Spinal, Epidural          Details (if applicable):  Trial of Labor No    Categorization: Repeat    Priority: Routine   Indications for : Repeat Section   Incision Type: Low Transverse     Additional  information:  Forceps:    Vacuum:    Breech:    Observed anomalies    Other (Comments):             Ralf Foster MD

## 2017-06-26 NOTE — ANESTHESIA PREPROCEDURE EVALUATION
2017  Silvia Bustamante is a 36 y.o., female.    Silvia Bustamante is a 36 y.o. female  @ 39w1d presents for repeat  section given history of  delivery (pre term twins).    This IUP is complicated by genital HSV, AMA, hx of gastric bypass, anemia and B12 deficiency.      Pt has an extensive h/o abd surgeries, including lap tomás, several ex laps, gastric band w/ subsequent repair, etc. Prev C/S was a CSE.    OB History    Para Term  AB Living   2 1   1   2   SAB TAB Ectopic Multiple Live Births         1 2      # Outcome Date GA Lbr Edu/2nd Weight Sex Delivery Anes PTL Lv   2 Current            1A  14 35w4d  2.07 kg (4 lb 9 oz) F CS-LTranv EPI, Spinal  FUNMI   1B  14 35w4d  2.56 kg (5 lb 10.3 oz) M CS-LTranv EPI, Spinal  FUNMI          Wt Readings from Last 1 Encounters:   17 1434 83.2 kg (183 lb 6.8 oz)       BP Readings from Last 3 Encounters:   17 106/80   17 116/74   17 120/72       Patient Active Problem List   Diagnosis    Genital HSV    AMA (advanced maternal age) multigravida 35+    H/O  section X 1    Anemia---HCT 30    History of gastric bypass    Low weight gain during pregnancy in second trimester    B12 deficiency    Pregnancy with one fetus in third trimester       Past Surgical History:   Procedure Laterality Date    APPENDECTOMY      corrective surgery for adhesion due to the appendectomy     SECTION  2014    X 1---KJB    GASTRIC BYPASS      AFTER HER BAND BROKE    HERNIA REPAIR      x2    LAPAROSCOPIC GASTRIC BANDING  2009    MOUTH SURGERY      Plastic surgery on chin         Social History     Social History    Marital status:      Spouse name: N/A    Number of children: N/A    Years of education: N/A     Occupational History          Social History  Main Topics    Smoking status: Never Smoker    Smokeless tobacco: Not on file    Alcohol use No      Comment: Socially ( pre pregnancy)    Drug use: No    Sexual activity: Yes     Partners: Male     Birth control/ protection: None     Other Topics Concern    Not on file     Social History Narrative    No narrative on file         Chemistry        Component Value Date/Time     05/10/2017 1235    K 3.4 (L) 05/10/2017 1235     05/10/2017 1235    CO2 22 (L) 05/10/2017 1235    BUN 3 (L) 05/10/2017 1235    CREATININE 0.5 05/10/2017 1235    GLU 77 05/10/2017 1235        Component Value Date/Time    CALCIUM 8.4 (L) 05/10/2017 1235    ALKPHOS 146 (H) 05/10/2017 1235    AST 12 05/10/2017 1235    ALT 8 (L) 05/10/2017 1235    BILITOT 0.4 05/10/2017 1235            Lab Results   Component Value Date    WBC 6.85 06/09/2017    HGB 9.8 (L) 06/09/2017    HCT 30.5 (L) 06/09/2017    MCV 82 06/09/2017     06/09/2017       No results for input(s): INR, PROTIME, APTT in the last 72 hours.    Invalid input(s): PT      Anesthesia Evaluation    I have reviewed the Patient Summary Reports.     I have reviewed the Medications.     Review of Systems  Anesthesia Hx:  No problems with previous Anesthesia  History of prior surgery of interest to airway management or planning: facial plastic/reconstructive. Previous anesthesia: General Denies Family Hx of Anesthesia complications.   Denies Personal Hx of Anesthesia complications.   Social:  Non-Smoker, No Alcohol Use    Hematology/Oncology:  Hematology Normal   Oncology Normal     EENT/Dental:EENT/Dental Normal   Cardiovascular:  Cardiovascular Normal Exercise tolerance: good     Pulmonary:  Pulmonary Normal    Renal/:  Renal/ Normal     Hepatic/GI:   GERD, well controlled History of lap band surgery  GERD was severe with reflux of food. She had her lap band loosened and now has minimal GERD that is well controlled with pepcid.    3 hernia repair surgeries.  Lysis of  adhesions   Musculoskeletal:  Musculoskeletal Normal    Neurological:  Neurology Normal    Dermatological:  Skin Normal    Psych:  Psychiatric Normal           Physical Exam  General:  Well nourished, Morbid Obesity    Airway/Jaw/Neck:  Airway Findings: Mouth Opening: Normal Tongue: Normal  General Airway Assessment: Adult  Mallampati: I  TM Distance: Normal, at least 6 cm  Jaw/Neck Findings:  Neck ROM: Normal ROM      Dental:  Dental Findings: In tact   Chest/Lungs:  Chest/Lungs Findings: Normal Respiratory Rate, Clear to auscultation     Heart/Vascular:  Heart Findings: Rate: Normal  Rhythm: Regular Rhythm     Abdomen:  Abdomen Findings: Normal    Musculoskeletal:  Musculoskeletal Findings: Normal   Skin:  Skin Findings: Normal    Mental Status:  Mental Status Findings:  Cooperative, Alert and Oriented         Anesthesia Plan  Type of Anesthesia, risks & benefits discussed:  Anesthesia Type:  CSE, general  Patient's Preference:   Intra-op Monitoring Plan: standard ASA monitors  Intra-op Monitoring Plan Comments:   Post Op Pain Control Plan:   Post Op Pain Control Plan Comments:   Induction:   IV  Beta Blocker:  Patient is not currently on a Beta-Blocker (No further documentation required).       Informed Consent: Patient understands risks and agrees with Anesthesia plan.  Questions answered. Anesthesia consent signed with patient.  ASA Score: 2     Day of Surgery Review of History & Physical:            Ready For Surgery From Anesthesia Perspective.

## 2017-06-26 NOTE — PROGRESS NOTES
Anesthesia notified that pt's bradycardic and feeling light-headed.  Dr. Lozada coming to bedside to assess patient.

## 2017-06-26 NOTE — PROGRESS NOTES
Mother would like to pump for her baby. Mother encouraged to provide breastmilk by pumping. Benefits of breastmilk discussed. Breastpump initiated. Mother educated on pump use, cleaning pump parts, labeling containers and storage of breastmilk. Mother to call her nurse with further questions.

## 2017-06-27 LAB
IRON SERPL-MCNC: 21 UG/DL
SATURATED IRON: 4 %
TOTAL IRON BINDING CAPACITY: 592 UG/DL
TRANSFERRIN SERPL-MCNC: 400 MG/DL

## 2017-06-27 PROCEDURE — 63600175 PHARM REV CODE 636 W HCPCS: Performed by: STUDENT IN AN ORGANIZED HEALTH CARE EDUCATION/TRAINING PROGRAM

## 2017-06-27 PROCEDURE — 11000001 HC ACUTE MED/SURG PRIVATE ROOM

## 2017-06-27 PROCEDURE — 83540 ASSAY OF IRON: CPT

## 2017-06-27 PROCEDURE — 25000003 PHARM REV CODE 250: Performed by: STUDENT IN AN ORGANIZED HEALTH CARE EDUCATION/TRAINING PROGRAM

## 2017-06-27 PROCEDURE — 84466 ASSAY OF TRANSFERRIN: CPT

## 2017-06-27 PROCEDURE — 36415 COLL VENOUS BLD VENIPUNCTURE: CPT

## 2017-06-27 RX ADMIN — ACETAMINOPHEN 650 MG: 325 TABLET ORAL at 03:06

## 2017-06-27 RX ADMIN — ACETAMINOPHEN 650 MG: 325 TABLET ORAL at 10:06

## 2017-06-27 RX ADMIN — SIMETHICONE CHEW TAB 80 MG 80 MG: 80 TABLET ORAL at 02:06

## 2017-06-27 RX ADMIN — DOCUSATE SODIUM 200 MG: 100 CAPSULE, LIQUID FILLED ORAL at 10:06

## 2017-06-27 RX ADMIN — IBUPROFEN 600 MG: 600 TABLET ORAL at 10:06

## 2017-06-27 RX ADMIN — OXYCODONE HYDROCHLORIDE AND ACETAMINOPHEN 1 TABLET: 5; 325 TABLET ORAL at 02:06

## 2017-06-27 RX ADMIN — DOCUSATE SODIUM 200 MG: 100 CAPSULE, LIQUID FILLED ORAL at 08:06

## 2017-06-27 RX ADMIN — OXYCODONE HYDROCHLORIDE AND ACETAMINOPHEN 1 TABLET: 5; 325 TABLET ORAL at 08:06

## 2017-06-27 RX ADMIN — IBUPROFEN 600 MG: 600 TABLET ORAL at 04:06

## 2017-06-27 RX ADMIN — KETOROLAC TROMETHAMINE 30 MG: 30 INJECTION, SOLUTION INTRAMUSCULAR at 03:06

## 2017-06-27 NOTE — PROGRESS NOTES
Pt requesting to leave Leon catheter in due to previous episodes of severe nausea, recent administration of phenergan, and resultant fatigue. Dr. Rhodes informed of pt request, pt having delivered today (6/26), states this is OK. Will continue to monitor.

## 2017-06-27 NOTE — LACTATION NOTE
"   06/27/17 1135   Maternal Infant Assessment   Breast Shape round   Breast Density soft   Areola elastic   Nipple(s) everted   Infant Assessment   Sucking Reflex present   Rooting Reflex present   Swallow Reflex present   LATCH Score   Latch 1-->repeated attempts, holds nipple in mouth, stimulate to suck   Audible Swallowing 1-->a few with stimulation   Type Of Nipple 2-->everted (after stimulation)   Comfort (Breast/Nipple) 2-->soft/nontender   Hold (Positioning) 1-->minimal assist, teach one side: mother does other, staff holds   Score (less than 7 for 2/more consecutive times, consult Lactation Consultant) 7   Maternal Infant Feeding   Maternal Emotional State assist needed   Infant Positioning clutch/"football"   Signs of Milk Transfer audible swallow;infant jaw motion present   Presence of Pain no   Time Spent (min) 30-60 min   Latch Assistance yes   Feeding Infant   Feeding Readiness Cues rooting   Effective Latch During Feeding yes   Audible Swallow yes   Suck/Swallow Coordination present   Skin-to-Skin Contact During Feeding yes   Equipment Type/Education   Pump Type Symphony   Breast Pump Type double electric, hospital grade   Breast Pump Flange Type hard   Breast Pump Flange Size 27 mm   Lactation Referrals   Lactation Consult Breastfeeding assessment;Initial assessment;Knowledge deficit   Lactation Interventions   Attachment Promotion breastfeeding assistance provided;counseling provided;rooming-in promoted;skin-to-skin contact encouraged   Breastfeeding Assistance assisted with positioning;feeding cue recognition promoted;feeding session observed;infant latch-on verified;infant suck/swallow verified;infant stimulated to wakeful state;support offered   Maternal Breastfeeding Support diary/feeding log utilized;encouragement offered;lactation counseling provided   Latch Promotion positioning assisted;infant moved to breast;suck stimulated with colostrum drop   Baby transferred back to MB unit from NICU. " Assisted mother with first feeding to breast. After several attempts in different positions and full assistance baby was able to latch to R and L breast in football. Baby latched with wide gape and occasional audible swallows. Instructed mother to use breast compression and stimulation throughout the feeding to keep baby active at the breast. Breastfeeding basics reviewed and hand expression demonstrated.

## 2017-06-27 NOTE — SUBJECTIVE & OBJECTIVE
Hospital course: 06/27/2017 - POD 1 s/p RLTCS. Patient meeting all post op and post partum milestones.    POD 1 s/p RLTCS. Patient had trent removed this morning. Has not voided yet. Is tolerating PO. Pain is well controlled. Denies lightheadedness, dizziness, syncope. Ambulating without difficulty.     Objective:     Vital Signs (Most Recent):  Temp: 98.1 °F (36.7 °C) (06/27/17 0355)  Pulse: 60 (06/27/17 0355)  Resp: 18 (06/27/17 0355)  BP: 103/72 (06/27/17 0355)  SpO2: 99 % (06/27/17 0355) Vital Signs (24h Range):  Temp:  [96.1 °F (35.6 °C)-98.2 °F (36.8 °C)] 98.1 °F (36.7 °C)  Pulse:  [48-95] 60  Resp:  [16-18] 18  SpO2:  [98 %-100 %] 99 %  BP: (102-141)/(55-85) 103/72     Weight: 83 kg (183 lb)  Body mass index is 31.91 kg/m².      Intake/Output Summary (Last 24 hours) at 06/27/17 0805  Last data filed at 06/27/17 0630   Gross per 24 hour   Intake             2000 ml   Output             2900 ml   Net             -900 ml       Significant Labs:  Lab Results   Component Value Date    GROUPTRH O POS 06/26/2017    HEPBSAG Negative 11/09/2016    STREPBCULT No Group B Streptococcus isolated 06/09/2017       Recent Labs  Lab 06/26/17  2127   HGB 7.8*   HCT 24.3*       I have personallly reviewed all pertinent lab results from the last 24 hours.    Physical Exam:   Constitutional: She appears well-developed and well-nourished. No distress.       Cardiovascular: Normal rate and regular rhythm.     Pulmonary/Chest: Effort normal.        Abdominal: Soft. She exhibits no distension and no abdominal incision. There is no tenderness.     Genitourinary: Vagina normal.                Skin: She is not diaphoretic.

## 2017-06-27 NOTE — PROGRESS NOTES
"Reported pt's repeat H/H of 7.8 and 24.3 to Dr. Rhodes. Reported pt denies symptoms at this time, but states, "I wouldn't really know though. I've just been sleeping." Dr. Rhodes will discuss w/pt in AM. No new orders at this time. Will continue to monitor.   "

## 2017-06-27 NOTE — ANESTHESIA POSTPROCEDURE EVALUATION
"Anesthesia Post Evaluation    Patient: Silvia Bustamante    Procedure(s) Performed: Procedure(s) (LRB):  DELIVERY- SECTION (N/A)    Final Anesthesia Type: CSE  Patient location during evaluation: labor & delivery  Patient participation: Yes- Able to Participate  Level of consciousness: awake and alert and oriented  Post-procedure vital signs: reviewed and stable  Pain management: adequate  Airway patency: patent  PONV status at discharge: No PONV  Anesthetic complications: no      Cardiovascular status: blood pressure returned to baseline and hemodynamically stable  Respiratory status: spontaneous ventilation, unassisted and room air  Hydration status: euvolemic  Follow-up not needed.        Visit Vitals  /71   Pulse (!) 54   Temp 37.1 °C (98.8 °F) (Oral)   Resp 18   Ht 5' 3.5" (1.613 m)   Wt 83 kg (183 lb)   LMP 2016   SpO2 100%   Breastfeeding? Unknown   BMI 31.91 kg/m²       Pain/Ashley Score: Pain Assessment Performed: Yes (2017  6:30 AM)  Presence of Pain: denies (2017  6:30 AM)  Pain Rating Prior to Med Admin: 0 (2017  3:57 AM)  Pain Rating Post Med Admin: 0 (2017  3:57 AM)      "

## 2017-06-27 NOTE — ASSESSMENT & PLAN NOTE
Recent Labs  Lab 06/26/17  0735 06/26/17  2127   WBC 5.90 8.87   HGB 9.0* 7.8*   HCT 27.7* 24.3*   MCV 80* 79*    218      - VSSAF. Patient asymptomatic. Continue to clinically monitor. Will order iron studies.

## 2017-06-27 NOTE — HOSPITAL COURSE
06/27/2017 - POD 1 s/p RLTCS. Patient meeting all post op and post partum milestones.  06/28/2017 - POD 2. No acute events.  06/29/2017 - POD 3. No acute events  06/30/2017 - POD 4. No acute events

## 2017-06-27 NOTE — PROGRESS NOTES
[facility]  Obstetrics  Postpartum Progress Note    Patient Name: Silvia Bustamante  MRN: [id number]  Admission Date: 6/26/2017  Hospital Length of Stay: 1 days  Attending Physician: Ralf Foster Jr., MD  Primary Care Provider: Primary Doctor No    Subjective:     Principal Problem:Anemia    Hospital course: 06/27/2017 - POD 1 s/p RLTCS. Patient meeting all post op and post partum milestones.    POD 1 s/p RLTCS. Patient had trent removed this morning. Has not voided yet. Is tolerating PO. Pain is well controlled. Denies lightheadedness, dizziness, syncope. Ambulating without difficulty.     Objective:     Vital Signs (Most Recent):  Temp: 98.1 °F (36.7 °C) (06/27/17 0355)  Pulse: 60 (06/27/17 0355)  Resp: 18 (06/27/17 0355)  BP: 103/72 (06/27/17 0355)  SpO2: 99 % (06/27/17 0355) Vital Signs (24h Range):  Temp:  [96.1 °F (35.6 °C)-98.2 °F (36.8 °C)] 98.1 °F (36.7 °C)  Pulse:  [48-95] 60  Resp:  [16-18] 18  SpO2:  [98 %-100 %] 99 %  BP: (102-141)/(55-85) 103/72     Weight: 83 kg (183 lb)  Body mass index is 31.91 kg/m².      Intake/Output Summary (Last 24 hours) at 06/27/17 0805  Last data filed at 06/27/17 0630   Gross per 24 hour   Intake             2000 ml   Output             2900 ml   Net             -900 ml       Significant Labs:  Lab Results   Component Value Date    GROUPTRH O POS 06/26/2017    HEPBSAG Negative 11/09/2016    STREPBCULT No Group B Streptococcus isolated 06/09/2017       Recent Labs  Lab 06/26/17 2127   HGB 7.8*   HCT 24.3*       I have personallly reviewed all pertinent lab results from the last 24 hours.    Physical Exam:   Constitutional: She appears well-developed and well-nourished. No distress.       Cardiovascular: Normal rate and regular rhythm.     Pulmonary/Chest: Effort normal.        Abdominal: Soft. She exhibits no distension and no abdominal incision. There is no tenderness.     Genitourinary: Vagina normal.                Skin: She is not diaphoretic.         Assessment/Plan:     36 y.o. female  for:    S/P     - Continue routine post partum advances  - Diet: regular  - Lactation: breast  - Contraception: discuss outpatient  - Encourage ambulation  - Pain well controlled on PO analgesics. Continue to monitor pain scale  - VSS. Wnl. Vitals q4          * Anemia---HCT 30      Recent Labs  Lab 17  0735 17  2127   WBC 5.90 8.87   HGB 9.0* 7.8*   HCT 27.7* 24.3*   MCV 80* 79*    218      - VSSAF. Patient asymptomatic. Continue to clinically monitor. Will order iron studies.             Disposition: As patient meets milestones, will plan to discharge POD 3-4.    Thierry Lazcano MD  Obstetrics  [facility], [place], [facility]

## 2017-06-28 LAB
BASOPHILS # BLD AUTO: 0.02 K/UL
BASOPHILS NFR BLD: 0.3 %
DIFFERENTIAL METHOD: ABNORMAL
EOSINOPHIL # BLD AUTO: 0.3 K/UL
EOSINOPHIL NFR BLD: 3.6 %
ERYTHROCYTE [DISTWIDTH] IN BLOOD BY AUTOMATED COUNT: 14.4 %
HCT VFR BLD AUTO: 27.2 %
HGB BLD-MCNC: 8.5 G/DL
LYMPHOCYTES # BLD AUTO: 1.4 K/UL
LYMPHOCYTES NFR BLD: 18.9 %
MCH RBC QN AUTO: 25.1 PG
MCHC RBC AUTO-ENTMCNC: 31.3 %
MCV RBC AUTO: 81 FL
MONOCYTES # BLD AUTO: 0.7 K/UL
MONOCYTES NFR BLD: 9.6 %
NEUTROPHILS # BLD AUTO: 4.9 K/UL
NEUTROPHILS NFR BLD: 67.5 %
PLATELET # BLD AUTO: 241 K/UL
PMV BLD AUTO: 9.6 FL
RBC # BLD AUTO: 3.38 M/UL
WBC # BLD AUTO: 7.21 K/UL

## 2017-06-28 PROCEDURE — 85025 COMPLETE CBC W/AUTO DIFF WBC: CPT

## 2017-06-28 PROCEDURE — 93010 ELECTROCARDIOGRAM REPORT: CPT | Mod: ,,, | Performed by: INTERNAL MEDICINE

## 2017-06-28 PROCEDURE — 25000003 PHARM REV CODE 250: Performed by: STUDENT IN AN ORGANIZED HEALTH CARE EDUCATION/TRAINING PROGRAM

## 2017-06-28 PROCEDURE — 93005 ELECTROCARDIOGRAM TRACING: CPT

## 2017-06-28 PROCEDURE — 36415 COLL VENOUS BLD VENIPUNCTURE: CPT

## 2017-06-28 PROCEDURE — 11000001 HC ACUTE MED/SURG PRIVATE ROOM

## 2017-06-28 RX ORDER — IBUPROFEN 600 MG/1
600 TABLET ORAL EVERY 6 HOURS
Qty: 30 TABLET | Refills: 0 | Status: SHIPPED | OUTPATIENT
Start: 2017-06-28 | End: 2017-08-09

## 2017-06-28 RX ORDER — SENNOSIDES 8.6 MG/1
8.6 TABLET ORAL 2 TIMES DAILY
Status: DISCONTINUED | OUTPATIENT
Start: 2017-06-28 | End: 2017-06-30 | Stop reason: HOSPADM

## 2017-06-28 RX ORDER — OXYCODONE AND ACETAMINOPHEN 5; 325 MG/1; MG/1
1 TABLET ORAL EVERY 4 HOURS PRN
Qty: 30 TABLET | Refills: 0 | Status: SHIPPED | OUTPATIENT
Start: 2017-06-28 | End: 2017-08-09

## 2017-06-28 RX ORDER — IRON POLYSACCHARIDE COMPLEX 150 MG
150 CAPSULE ORAL DAILY
Status: DISCONTINUED | OUTPATIENT
Start: 2017-06-28 | End: 2017-06-30 | Stop reason: SDUPTHER

## 2017-06-28 RX ADMIN — IBUPROFEN 600 MG: 600 TABLET ORAL at 05:06

## 2017-06-28 RX ADMIN — OXYCODONE HYDROCHLORIDE AND ACETAMINOPHEN 1 TABLET: 10; 325 TABLET ORAL at 12:06

## 2017-06-28 RX ADMIN — Medication 150 MG: at 09:06

## 2017-06-28 RX ADMIN — IBUPROFEN 600 MG: 600 TABLET ORAL at 12:06

## 2017-06-28 RX ADMIN — SIMETHICONE CHEW TAB 80 MG 80 MG: 80 TABLET ORAL at 08:06

## 2017-06-28 RX ADMIN — IBUPROFEN 600 MG: 600 TABLET ORAL at 11:06

## 2017-06-28 RX ADMIN — SENNOSIDES 8.6 MG: 8.6 TABLET, FILM COATED ORAL at 08:06

## 2017-06-28 RX ADMIN — IBUPROFEN 600 MG: 600 TABLET ORAL at 06:06

## 2017-06-28 RX ADMIN — SENNOSIDES 8.6 MG: 8.6 TABLET, FILM COATED ORAL at 11:06

## 2017-06-28 RX ADMIN — OXYCODONE HYDROCHLORIDE AND ACETAMINOPHEN 1 TABLET: 10; 325 TABLET ORAL at 08:06

## 2017-06-28 RX ADMIN — OXYCODONE HYDROCHLORIDE AND ACETAMINOPHEN 1 TABLET: 5; 325 TABLET ORAL at 01:06

## 2017-06-28 RX ADMIN — SIMETHICONE CHEW TAB 80 MG 80 MG: 80 TABLET ORAL at 01:06

## 2017-06-28 NOTE — ASSESSMENT & PLAN NOTE
- acute on chronic anemia  - H/h 9/27, postpartum 7.8/24  - VSS, asymptomatic  - Iron studies consistent w/ JOELLE. Will start iron supplement.

## 2017-06-28 NOTE — ASSESSMENT & PLAN NOTE
- Continue routine post partum advances  - Diet: regular  - Lactation: breast  - Contraception: discuss outpatient  - Encourage ambulation  - Pain well controlled on PO analgesics. Continue to monitor pain scale  - VSS. Wnl. Vitals q4  - Rh pos

## 2017-06-28 NOTE — PLAN OF CARE
Problem: Postpartum ( Delivery) (Adult,Obstetrics,Pediatric)  Intervention: Support Life/Role Transition  Vital signs are stable. Ambulating and voiding without difficulty. Vaginal bleeding is light. Discussed POC with patient and patient verbalizes understanding.

## 2017-06-28 NOTE — PROGRESS NOTES
[facility]  Obstetrics  Postpartum Progress Note    Patient Name: Silvia Bustamante  MRN: [id number]  Admission Date: 2017  Hospital Length of Stay: 2 days  Attending Physician: Ralf Foster Jr., MD  Primary Care Provider: Primary Doctor No    Subjective:     Principal Problem:S/P     Hospital course:   2017 - POD 1 s/p RLTCS. Patient meeting all post op and post partum milestones.  2017 - POD 2. No acute events.    POD 2 s/p RLTCS. Tolerating PO without N/V. Pain is well controlled. Denies lightheadedness, dizziness, syncope. Ambulating and voiding without issue. +flatus, no BM.    Objective:     Vital Signs (Most Recent):  Temp: 98.4 °F (36.9 °C) (17)  Pulse: 61 (17)  Resp: 18 (17)  BP: 110/69 (17)  SpO2: 97 % (17) Vital Signs (24h Range):  Temp:  [97.7 °F (36.5 °C)-98.8 °F (37.1 °C)] 98.4 °F (36.9 °C)  Pulse:  [54-61] 61  Resp:  [18] 18  SpO2:  [97 %-100 %] 97 %  BP: (109-113)/(68-71) 110/69     Weight: 83 kg (183 lb)  Body mass index is 31.91 kg/m².      Intake/Output Summary (Last 24 hours) at 17 0657  Last data filed at 17 1700   Gross per 24 hour   Intake                0 ml   Output             1275 ml   Net            -1275 ml       Significant Labs:  Lab Results   Component Value Date    GROUPTRH O POS 2017    HEPBSAG Negative 2016    STREPBCULT No Group B Streptococcus isolated 2017       Recent Labs  Lab 17   HGB 7.8*   HCT 24.3*       I have personallly reviewed all pertinent lab results from the last 24 hours.    Physical Exam:   Constitutional: She is oriented to person, place, and time. She appears well-developed and well-nourished. No distress.       Cardiovascular: Normal rate and regular rhythm.     Pulmonary/Chest: Effort normal.        Abdominal: Soft. She exhibits abdominal incision (c/d/i). She exhibits no distension. There is tenderness (appropriate).      Genitourinary: Vagina normal.   Genitourinary Comments: Fundus firm, below umbilicus               Neurological: She is alert and oriented to person, place, and time.    Skin: Skin is warm and dry. She is not diaphoretic.        Assessment/Plan:     36 y.o. female  for:    Anemia---HCT 30    - acute on chronic anemia  - H/h , postpartum 7.8/  - VSS, asymptomatic  - Iron studies consistent w/ JOELLE. Will start iron supplement.         * S/P     - Continue routine post partum advances  - Diet: regular  - Lactation: breast  - Contraception: discuss outpatient  - Encourage ambulation  - Pain well controlled on PO analgesics. Continue to monitor pain scale  - VSS. Wnl. Vitals q4  - Rh pos            Disposition: As patient meets milestones, will plan to discharge POD 2.    Melly Ospina MD  Obstetrics  [facility]

## 2017-06-28 NOTE — SUBJECTIVE & OBJECTIVE
Hospital course: 06/27/2017 - POD 1 s/p RLTCS. Patient meeting all post op and post partum milestones.  06/28/2017 - POD 2. No acute events.    POD 1 s/p RLTCS. Patient had trent removed this morning. Has not voided yet. Is tolerating PO. Pain is well controlled. Denies lightheadedness, dizziness, syncope. Ambulating without difficulty.     Objective:     Vital Signs (Most Recent):  Temp: 98.4 °F (36.9 °C) (06/27/17 2255)  Pulse: 61 (06/27/17 2255)  Resp: 18 (06/27/17 2255)  BP: 110/69 (06/27/17 2255)  SpO2: 97 % (06/27/17 2255) Vital Signs (24h Range):  Temp:  [97.7 °F (36.5 °C)-98.8 °F (37.1 °C)] 98.4 °F (36.9 °C)  Pulse:  [54-61] 61  Resp:  [18] 18  SpO2:  [97 %-100 %] 97 %  BP: (109-113)/(68-71) 110/69     Weight: 83 kg (183 lb)  Body mass index is 31.91 kg/m².      Intake/Output Summary (Last 24 hours) at 06/28/17 0657  Last data filed at 06/27/17 1700   Gross per 24 hour   Intake                0 ml   Output             1275 ml   Net            -1275 ml       Significant Labs:  Lab Results   Component Value Date    GROUPTRH O POS 06/26/2017    HEPBSAG Negative 11/09/2016    STREPBCULT No Group B Streptococcus isolated 06/09/2017       Recent Labs  Lab 06/26/17 2127   HGB 7.8*   HCT 24.3*       I have personallly reviewed all pertinent lab results from the last 24 hours.    Physical Exam:   Constitutional: She is oriented to person, place, and time. She appears well-developed and well-nourished. No distress.       Cardiovascular: Normal rate and regular rhythm.     Pulmonary/Chest: Effort normal.        Abdominal: Soft. She exhibits abdominal incision (c/d/i). She exhibits no distension. There is tenderness (appropriate).     Genitourinary: Vagina normal.   Genitourinary Comments: Fundus firm, below umbilicus               Neurological: She is alert and oriented to person, place, and time.    Skin: Skin is warm and dry. She is not diaphoretic.

## 2017-06-29 PROBLEM — Z34.93 PREGNANCY WITH ONE FETUS IN THIRD TRIMESTER: Status: RESOLVED | Noted: 2017-06-26 | Resolved: 2017-06-29

## 2017-06-29 PROCEDURE — 25000003 PHARM REV CODE 250: Performed by: STUDENT IN AN ORGANIZED HEALTH CARE EDUCATION/TRAINING PROGRAM

## 2017-06-29 PROCEDURE — 11000001 HC ACUTE MED/SURG PRIVATE ROOM

## 2017-06-29 RX ORDER — HYDROCODONE BITARTRATE AND ACETAMINOPHEN 10; 325 MG/1; MG/1
1 TABLET ORAL EVERY 4 HOURS PRN
Status: DISCONTINUED | OUTPATIENT
Start: 2017-06-29 | End: 2017-06-30 | Stop reason: HOSPADM

## 2017-06-29 RX ORDER — HYDROCODONE BITARTRATE AND ACETAMINOPHEN 5; 325 MG/1; MG/1
1 TABLET ORAL EVERY 4 HOURS PRN
Status: DISCONTINUED | OUTPATIENT
Start: 2017-06-29 | End: 2017-06-30 | Stop reason: HOSPADM

## 2017-06-29 RX ADMIN — IBUPROFEN 600 MG: 600 TABLET ORAL at 06:06

## 2017-06-29 RX ADMIN — IBUPROFEN 600 MG: 600 TABLET ORAL at 05:06

## 2017-06-29 RX ADMIN — SENNOSIDES 8.6 MG: 8.6 TABLET, FILM COATED ORAL at 08:06

## 2017-06-29 RX ADMIN — HYDROCODONE BITARTRATE AND ACETAMINOPHEN 1 TABLET: 5; 325 TABLET ORAL at 10:06

## 2017-06-29 RX ADMIN — IBUPROFEN 600 MG: 600 TABLET ORAL at 12:06

## 2017-06-29 RX ADMIN — Medication 150 MG: at 08:06

## 2017-06-29 RX ADMIN — HYDROCODONE BITARTRATE AND ACETAMINOPHEN 1 TABLET: 5; 325 TABLET ORAL at 08:06

## 2017-06-29 RX ADMIN — SIMETHICONE CHEW TAB 80 MG 80 MG: 80 TABLET ORAL at 08:06

## 2017-06-29 NOTE — SUBJECTIVE & OBJECTIVE
Hospital course: 06/27/2017 - POD 1 s/p RLTCS. Patient meeting all post op and post partum milestones.  06/28/2017 - POD 2. No acute events.  06/29/2017 - POD 3. No acute events    POD 1 s/p RLTCS. Voiding and ambulating. Is tolerating PO. Pain is well controlled. Denies lightheadedness, dizziness, syncope. Ambulating without difficulty.     Objective:     Vital Signs (Most Recent):  Temp: 98.7 °F (37.1 °C) (06/28/17 2257)  Pulse: 62 (06/28/17 2257)  Resp: 18 (06/28/17 2257)  BP: 105/63 (06/28/17 2257)  SpO2: 96 % (06/28/17 2257) Vital Signs (24h Range):  Temp:  [98.2 °F (36.8 °C)-98.8 °F (37.1 °C)] 98.7 °F (37.1 °C)  Pulse:  [62-82] 62  Resp:  [12-18] 18  SpO2:  [96 %-98 %] 96 %  BP: (105-135)/(63-81) 105/63     Weight: 83 kg (183 lb)  Body mass index is 31.91 kg/m².      Intake/Output Summary (Last 24 hours) at 06/29/17 0724  Last data filed at 06/28/17 1700   Gross per 24 hour   Intake              700 ml   Output              800 ml   Net             -100 ml       Significant Labs:  Lab Results   Component Value Date    GROUPTRH O POS 06/26/2017    HEPBSAG Negative 11/09/2016    STREPBCULT No Group B Streptococcus isolated 06/09/2017       Recent Labs  Lab 06/28/17  2114   HGB 8.5*   HCT 27.2*       I have personallly reviewed all pertinent lab results from the last 24 hours.    Physical Exam:   Constitutional: She is oriented to person, place, and time. She appears well-developed and well-nourished. No distress.       Cardiovascular: Normal rate and regular rhythm.     Pulmonary/Chest: Effort normal.        Abdominal: Soft. She exhibits abdominal incision (c/d/i). She exhibits no distension. There is tenderness (appropriate).     Genitourinary: Vagina normal.   Genitourinary Comments: Fundus firm, below umbilicus               Neurological: She is alert and oriented to person, place, and time.    Skin: Skin is warm and dry. She is not diaphoretic.

## 2017-06-29 NOTE — LACTATION NOTE
Lactation rounds. Patient reports baby is breastfeeding well and denies any questions or concerns at this time. Encouraged to call for assistance as needed, and provided contact number. Patient voices understanding.

## 2017-06-29 NOTE — LACTATION NOTE
Lactation rounds. Patient reports baby is currently sleepy but has been feeding well. Feels breasts are filing with milk supply beginning to increase and feels baby transfers milk well at breast. Denies questions or concerns at this time. Encouraged to call for assistance as needed, and contact number placed on white board.

## 2017-06-29 NOTE — PROGRESS NOTES
[facility]  Obstetrics  Postpartum Progress Note    Patient Name: Silvia Bustamante  MRN: [id number]  Admission Date: 2017  Hospital Length of Stay: 3 days  Attending Physician: Ralf Foster Jr., MD  Primary Care Provider: Primary Doctor No    Subjective:     Principal Problem:S/P     Hospital course: 2017 - POD 1 s/p RLTCS. Patient meeting all post op and post partum milestones.  2017 - POD 2. No acute events.  2017 - POD 3. No acute events    POD 1 s/p RLTCS. Voiding and ambulating. Is tolerating PO. Pain is well controlled. Denies lightheadedness, dizziness, syncope. Ambulating without difficulty.     Objective:     Vital Signs (Most Recent):  Temp: 98.7 °F (37.1 °C) (17)  Pulse: 62 (17)  Resp: 18 (17)  BP: 105/63 (17)  SpO2: 96 % (17) Vital Signs (24h Range):  Temp:  [98.2 °F (36.8 °C)-98.8 °F (37.1 °C)] 98.7 °F (37.1 °C)  Pulse:  [62-82] 62  Resp:  [12-18] 18  SpO2:  [96 %-98 %] 96 %  BP: (105-135)/(63-81) 105/63     Weight: 83 kg (183 lb)  Body mass index is 31.91 kg/m².      Intake/Output Summary (Last 24 hours) at 17 0724  Last data filed at 17 1700   Gross per 24 hour   Intake              700 ml   Output              800 ml   Net             -100 ml       Significant Labs:  Lab Results   Component Value Date    GROUPTRH O POS 2017    HEPBSAG Negative 2016    STREPBCULT No Group B Streptococcus isolated 2017       Recent Labs  Lab 17   HGB 8.5*   HCT 27.2*       I have personallly reviewed all pertinent lab results from the last 24 hours.    Physical Exam:   Constitutional: She is oriented to person, place, and time. She appears well-developed and well-nourished. No distress.       Cardiovascular: Normal rate and regular rhythm.     Pulmonary/Chest: Effort normal.        Abdominal: Soft. She exhibits abdominal incision (c/d/i). She exhibits no distension. There is  tenderness (appropriate).     Genitourinary: Vagina normal.   Genitourinary Comments: Fundus firm, below umbilicus               Neurological: She is alert and oriented to person, place, and time.    Skin: Skin is warm and dry. She is not diaphoretic.        Assessment/Plan:     36 y.o. female  for:    B12 deficiency    - Stable. F/u outpatient        History of gastric bypass    - Stable. Continue to monitor        Anemia---HCT 30    - acute on chronic anemia  - H/h , postpartum 7.8  - VSS, asymptomatic  - Iron studies consistent w/ JOELLE. Will start iron supplement.         * S/P     - Continue routine post partum advances  - Diet: regular  - Lactation: breast  - Contraception: discuss outpatient  - Encourage ambulation  - Pain well controlled on PO analgesics. Continue to monitor pain scale  - VSS. Wnl. Vitals q4  - Rh pos            Disposition: As patient meets milestones, will plan to discharge POD 3.    Thierry Lazcano MD  Obstetrics  [facility]

## 2017-06-29 NOTE — PROGRESS NOTES
"Dr. Rhodes notified pt complains of hot feeling over body and states, "I am dizzy the room feels like it is spinning" "everything is kind of echoing". Symptoms started during breastfeeding. MD to see pt and place orders for EKG and CBC. Pt safety maintained. Will continue to monitor.     2056 RN called to room. Pt complaining of hot sensation and lips tingling. MD in room. MD states continue to monitor. Pt safety maintained. Will continue to monitor.   "

## 2017-06-29 NOTE — PROGRESS NOTES
Dr. Azul notified of pt request to change medication regiment for pain control. MD to see pt on AM rounds and discuss plan of care with patient. Pt safety maintained. Will continue to monitor.

## 2017-06-30 VITALS
HEIGHT: 64 IN | WEIGHT: 183 LBS | TEMPERATURE: 99 F | DIASTOLIC BLOOD PRESSURE: 93 MMHG | SYSTOLIC BLOOD PRESSURE: 136 MMHG | OXYGEN SATURATION: 98 % | RESPIRATION RATE: 18 BRPM | BODY MASS INDEX: 31.24 KG/M2 | HEART RATE: 70 BPM

## 2017-06-30 PROCEDURE — 25000003 PHARM REV CODE 250: Performed by: STUDENT IN AN ORGANIZED HEALTH CARE EDUCATION/TRAINING PROGRAM

## 2017-06-30 RX ORDER — FERROUS SULFATE 325(65) MG
325 TABLET ORAL DAILY
Qty: 30 TABLET | Refills: 0 | COMMUNITY
Start: 2017-06-30 | End: 2017-08-09

## 2017-06-30 RX ORDER — SENNOSIDES 8.6 MG/1
1 TABLET ORAL 2 TIMES DAILY
COMMUNITY
Start: 2017-06-30 | End: 2017-08-09

## 2017-06-30 RX ORDER — IRON POLYSACCHARIDE COMPLEX 150 MG
150 CAPSULE ORAL DAILY
Refills: 0 | COMMUNITY
Start: 2017-06-30 | End: 2017-08-09

## 2017-06-30 RX ORDER — DOCUSATE SODIUM 100 MG/1
100 CAPSULE, LIQUID FILLED ORAL 2 TIMES DAILY
Qty: 60 CAPSULE | Refills: 0 | COMMUNITY
Start: 2017-06-30 | End: 2017-08-09

## 2017-06-30 RX ORDER — IRON POLYSACCHARIDE COMPLEX 150 MG
150 CAPSULE ORAL DAILY
Status: DISCONTINUED | OUTPATIENT
Start: 2017-06-30 | End: 2017-06-30 | Stop reason: HOSPADM

## 2017-06-30 RX ADMIN — Medication 150 MG: at 09:06

## 2017-06-30 RX ADMIN — IBUPROFEN 600 MG: 600 TABLET ORAL at 06:06

## 2017-06-30 RX ADMIN — SENNOSIDES 8.6 MG: 8.6 TABLET, FILM COATED ORAL at 09:06

## 2017-06-30 RX ADMIN — IBUPROFEN 600 MG: 600 TABLET ORAL at 12:06

## 2017-06-30 RX ADMIN — HYDROCODONE BITARTRATE AND ACETAMINOPHEN 1 TABLET: 5; 325 TABLET ORAL at 12:06

## 2017-06-30 NOTE — SUBJECTIVE & OBJECTIVE
Hospital course: 06/27/2017 - POD 1 s/p RLTCS. Patient meeting all post op and post partum milestones.  06/28/2017 - POD 2. No acute events.  06/29/2017 - POD 3. No acute events  06/30/2017 - POD 4. No acute events    POD 4 s/p RLTCS. Voiding and ambulating. Is tolerating PO. Pain is well controlled. Denies lightheadedness, dizziness, syncope. Ambulating without difficulty.     Objective:     Vital Signs (Most Recent):  Temp: 99.4 °F (37.4 °C) (06/29/17 2252)  Pulse: 64 (06/29/17 2252)  Resp: 18 (06/29/17 2252)  BP: 122/60 (06/29/17 2252)  SpO2: 98 % (06/29/17 2252) Vital Signs (24h Range):  Temp:  [98.7 °F (37.1 °C)-99.4 °F (37.4 °C)] 99.4 °F (37.4 °C)  Pulse:  [62-80] 64  Resp:  [18] 18  SpO2:  [98 %-99 %] 98 %  BP: (122-127)/(60-76) 122/60     Weight: 83 kg (183 lb)  Body mass index is 31.91 kg/m².    No intake or output data in the 24 hours ending 06/30/17 0702    Significant Labs:  Lab Results   Component Value Date    GROUPTRH O POS 06/26/2017    HEPBSAG Negative 11/09/2016    STREPBCULT No Group B Streptococcus isolated 06/09/2017       Recent Labs  Lab 06/28/17  2114   HGB 8.5*   HCT 27.2*       I have personallly reviewed all pertinent lab results from the last 24 hours.    Physical Exam:   Constitutional: She is oriented to person, place, and time. She appears well-developed and well-nourished. No distress.       Cardiovascular: Normal rate and regular rhythm.     Pulmonary/Chest: Effort normal.        Abdominal: Soft. She exhibits abdominal incision (c/d/i). She exhibits no distension. There is tenderness (appropriate).     Genitourinary: Vagina normal.   Genitourinary Comments: Fundus firm, below umbilicus               Neurological: She is alert and oriented to person, place, and time.    Skin: Skin is warm and dry. She is not diaphoretic.

## 2017-06-30 NOTE — PLAN OF CARE
Problem: Patient Care Overview  Goal: Plan of Care Review  Outcome: Ongoing (interventions implemented as appropriate)  Pt tolerating PO well, no acute distress, ambulating and voiding without difficulty, bleeding scant, fundus firm, pain well controlled on prescribed meds, bonding well with infant-- breast and formula feeding. Pt safety maintained. Will continue to monitor.

## 2017-06-30 NOTE — LACTATION NOTE
06/30/17 0930   Maternal Infant Assessment   Breast Density Bilateral:;filling   Breasts WDL   Breasts WDL WDL   Pain/Comfort Assessments   Pain Assessment Performed Yes       Number Scale   Presence of Pain denies   Location nipple(s)   Pain Rating: Rest 0   Pain Rating: Activity 0   Maternal Infant Feeding   Maternal Emotional State independent;relaxed   Time Spent (min) 15-30 min   Latch Assistance other (see comments)  (to call)   Engorgement Measures (reviewed)   Breastfeeding Education adequate infant intake;adequate milk volume;diet;importance of skin-to-skin contact;increasing milk supply;label/storage of breast milk;milk expression, electric pump;milk expression, hand   Equipment Type/Education   Pump Type Symphony   Breast Pump Type double electric, hospital grade   Breast Pump Flange Type hard   Breast Pump Flange Size 24 mm   Breast Pumping Bilateral Breasts:   Pumping Frequency (times) (after feedings and as needed)   Lactation Referrals   Lactation Consult Follow up   Lactation Interventions   Attachment Promotion counseling provided   Breastfeeding Assistance feeding cue recognition promoted;feeding on demand promoted   Maternal Breastfeeding Support diary/feeding log utilized;encouragement offered;infant-mother separation minimized;lactation counseling provided;maternal hydration promoted;maternal nutrition promoted;maternal rest encouraged   Latch Promotion other (see comments)  (to call)   lactation discharge education reviewed. Encouraged to call for latch assistance.

## 2017-06-30 NOTE — PROGRESS NOTES
[facility]  Obstetrics  Postpartum Progress Note    Patient Name: Silvia Bustamante  MRN: [id number]  Admission Date: 2017  Hospital Length of Stay: 4 days  Attending Physician: Ralf Foster Jr., MD  Primary Care Provider: Primary Doctor No    Subjective:     Principal Problem:S/P     Hospital course: 2017 - POD 1 s/p RLTCS. Patient meeting all post op and post partum milestones.  2017 - POD 2. No acute events.  2017 - POD 3. No acute events  2017 - POD 4. No acute events    POD 4 s/p RLTCS. Voiding and ambulating. Is tolerating PO. Pain is well controlled. Denies lightheadedness, dizziness, syncope. Ambulating without difficulty.     Objective:     Vital Signs (Most Recent):  Temp: 99.4 °F (37.4 °C) (17)  Pulse: 64 (17)  Resp: 18 (17)  BP: 122/60 (17)  SpO2: 98 % (17) Vital Signs (24h Range):  Temp:  [98.7 °F (37.1 °C)-99.4 °F (37.4 °C)] 99.4 °F (37.4 °C)  Pulse:  [62-80] 64  Resp:  [18] 18  SpO2:  [98 %-99 %] 98 %  BP: (122-127)/(60-76) 122/60     Weight: 83 kg (183 lb)  Body mass index is 31.91 kg/m².    No intake or output data in the 24 hours ending 17 0702    Significant Labs:  Lab Results   Component Value Date    GROUPTRH O POS 2017    HEPBSAG Negative 2016    STREPBCULT No Group B Streptococcus isolated 2017       Recent Labs  Lab 17  2114   HGB 8.5*   HCT 27.2*       I have personallly reviewed all pertinent lab results from the last 24 hours.    Physical Exam:   Constitutional: She is oriented to person, place, and time. She appears well-developed and well-nourished. No distress.       Cardiovascular: Normal rate and regular rhythm.     Pulmonary/Chest: Effort normal.        Abdominal: Soft. She exhibits abdominal incision (c/d/i). She exhibits no distension. There is tenderness (appropriate).     Genitourinary: Vagina normal.   Genitourinary Comments: Fundus firm,  below umbilicus               Neurological: She is alert and oriented to person, place, and time.    Skin: Skin is warm and dry. She is not diaphoretic.        Assessment/Plan:     36 y.o. female  for:    B12 deficiency    - Stable. F/u outpatient        History of gastric bypass    - Stable. Continue to monitor        Anemia---HCT 30    - acute on chronic anemia  - H/h , postpartum 7.8  - VSS, asymptomatic  - Iron studies consistent w/ JOELLE. Will start iron supplement.         * S/P     - Continue routine post partum advances  - Diet: regular  - Lactation: breast  - Contraception: discuss outpatient  - Encourage ambulation  - Pain well controlled on PO analgesics. Continue to monitor pain scale  - VSS. Wnl. Vitals q4  - Rh pos            Disposition: As patient meets milestones, will plan to discharge today.    Thierry Lazcano MD  Obstetrics  [facility]      Ralf Foster MD

## 2017-06-30 NOTE — DISCHARGE SUMMARY
[facility]  Obstetrics  Discharge Summary      Patient Name: Silvia Bustamante  MRN: [id number]  Admission Date: 2017  Hospital Length of Stay: 4 days  Discharge Date and Time:  2017 6:30 AM  Attending Physician: Ralf Foster Jr., MD   Discharging Provider: Bhargavi Azul MD  Primary Care Provider: Primary Doctor No    HPI: No notes on file    Procedure(s) (LRB):  DELIVERY- SECTION (N/A)     Hospital Course:   2017 - POD 1 s/p RLTCS. Patient meeting all post op and post partum milestones.  2017 - POD 2. No acute events.  2017 - POD 3. No acute events  2017 - POD#4, ready for discharg.e     Consults         Status Ordering Provider     Inpatient consult to Anesthesiology  Once     Provider:  (Not yet assigned)    Acknowledged LAUREN MARTINEZ          Final Active Diagnoses:    Diagnosis Date Noted POA    PRINCIPAL PROBLEM:  S/P  [Z98.891] 2017 Not Applicable    B12 deficiency [E53.8] 2017 Yes    History of gastric bypass [Z98.890] 2016 Not Applicable    Anemia---HCT 30 [D64.9] 2016 Yes      Problems Resolved During this Admission:    Diagnosis Date Noted Date Resolved POA    Pregnancy with one fetus in third trimester [Z34.93] 2017 Not Applicable    Pregnancy with one fetus in third trimester [Z34.93] 2017 Not Applicable    AMA (advanced maternal age) multigravida 35+ [O09.529] 2016 Yes    H/O  section X 1 [Z98.891] 2016 Not Applicable    Genital HSV [A60.00] 2013 Yes        Labs:   CBC   Recent Labs  Lab 174   WBC 7.21   HGB 8.5*   HCT 27.2*          Feeding Method: breast    Immunizations     Date Immunization Status Dose Route/Site Given by    17 1219 MMR Incomplete 0.5 mL Subcutaneous/Left deltoid     17 1219 Tdap Incomplete 0.5 mL Intramuscular/Left deltoid           Delivery:     Episiotomy: None   Lacerations: None   Repair suture:     Repair # of packets:     Blood loss (ml):       Birth information:  YOB: 2017   Time of birth: 8:38 AM   Sex: male   Delivery type: , Low Transverse   Gestational Age: 39w1d    Delivery Clinician:      Other providers:       Additional  information:  Forceps:    Vacuum:    Breech:    Observed anomalies      Living?:           APGARS  One minute Five minutes Ten minutes   Skin color:         Heart rate:         Grimace:         Muscle tone:         Breathing:         Totals: 7  7        Placenta: Delivered:       appearance    Pending Diagnostic Studies:     None          Discharged Condition: good    Disposition: Home or Self Care    Follow Up:  Follow-up Information     Ralf Foster Jr, MD In 6 weeks.    Specialties:  Obstetrics, Obstetrics and Gynecology  Why:  Postpartum visit  Contact information:  [address] [address] [phone]                 Patient Instructions:     Diet general     Activity as tolerated     Other restrictions (specify):   Order Comments: Nothing in vagina for 6 weeks.     Call MD for:   Order Comments: Heavy vaginal bleeding saturating greater than or equal to 1 pad per hour   Any headache unresolved by tylenol, blurry vision or other visual changes, extreme nausea/vomiting, severe pain under your right breast.     Call MD for:  increased confusion or weakness     Call MD for:  persistent dizziness, light-headedness, or visual disturbances     Call MD for:  worsening rash     Call MD for:  severe persistent headache     Call MD for:  difficulty breathing or increased cough     Call MD for:  redness, tenderness, or signs of infection (pain, swelling, redness, odor or green/yellow discharge around incision site)     Call MD for:  severe uncontrolled pain     Call MD for:  persistent nausea and vomiting or diarrhea     Call MD for:  temperature >100.4     No dressing needed    Order Comments: Please keep incision clean and dry. Wash daily with soap and water. Allow to air dry completely.       Medications:  Current Discharge Medication List      START taking these medications    Details   ibuprofen (ADVIL,MOTRIN) 600 MG tablet Take 1 tablet (600 mg total) by mouth every 6 (six) hours.  Qty: 30 tablet, Refills: 0      iron polysaccharides (NIFEREX) 150 mg iron Cap Take 1 capsule (150 mg total) by mouth once daily.  Refills: 0      oxycodone-acetaminophen (PERCOCET) 5-325 mg per tablet Take 1 tablet by mouth every 4 (four) hours as needed.  Qty: 30 tablet, Refills: 0      senna (SENOKOT) 8.6 mg tablet Take 1 tablet by mouth 2 (two) times daily.         CONTINUE these medications which have NOT CHANGED    Details   doxylamine-pyridoxine 10-10 mg TbEC Take 1 capsule by mouth 3 (three) times daily as needed.  Qty: 120 tablet, Refills: 3      iron,carbon,gluc-FA-B12-C-DSS (FERRALET 90 DUAL-IRON DELIVERY) 90-1-12-50 mg-mg-mcg-mg Tab Take 1 capsule by mouth once daily.  Qty: 60 tablet, Refills: 12      valacyclovir (VALTREX) 500 MG tablet Take 1 tablet (500 mg total) by mouth once daily.  Qty: 30 tablet, Refills: 12             Bhargavi Azul MD  Obstetrics [facility].LIZBETH Foster MD

## 2017-06-30 NOTE — DISCHARGE INSTRUCTIONS
Breastfeeding Discharge Instructions       Feed the baby at the earliest sign of hunger or comfort  o Hands to mouth, sucking motions  o Rooting or searching for something to suck on  o Dont wait for crying - it is a sign of distress     The feedings may be 8-12 times per 24hrs and will not follow a schedule   Avoid pacifiers and bottles for the first 4 weeks   Alternate the breast you start the feeding with, or start with the breast that feels the fullest   Switch breasts when the baby takes himself off the breast or falls asleep   Keep offering breasts until the baby looks full, no longer gives hunger signs, and stays asleep when placed on his back in the crib   If the baby is sleepy and wont wake for a feeding, put the baby skin-to-skin dressed in a diaper against the mothers bare chest   Sleep near your baby   The baby should be positioned and latched on to the breast correctly  o Chest-to-chest, chin in the breast  o Babys lips are flipped outward  o Babys mouth is stretched open wide like a shout  o Babys sucking should feel like tugging to the mother  - The baby should be drinking at the breast:  o You should hear swallowing or gulping throughout the feeding  o You should see milk on the babys lips when he comes off the breast  o Your breasts should be softer when the baby is finished feeding  o The baby should look relaxed at the end of feedings  o After the 4th day and your milk is in:  o The babys poop should turn bright yellow and be loose, watery, and seedy  o The baby should have at least 3-4 poops the size of the palm of your hand per day  o The baby should have at least 5-6 wet diapers per day  o The urine should be light yellow in color  You should drink when you are thirsty and eat a healthy diet when you are    hungry.     Take naps to get the rest you need.   Take medications and/or drink alcohol only with permission of your obstetrician    or the babys pediatrician.  You can  also call the [facility] Risk [facility],   ([phone]), Monday-Friday, 8am-5pm Central time, to get the most   up-to-date evidence-based information on the use of medications during   pregnancy and breastfeeding.      The baby should be examined by a pediatrician at 3-5 days of age.  Once your   milk comes in, the baby should be gaining at least ½ - 1oz each day and should be back to birthweight no later than 10-14 days of age.          Community Resources    [facility] Breastfeeding Warmline: [phone]   Local [facility] clinics: provide incentives and breastpumps to eligible mothers  [employer] (LLLI):  mother-to-mother support group website        www.[employer]  Local La Leche League mother-to-mother support groups:        www.[employer]        La Leche League [facility]   Dr. Riley Sierra website for latch videos and general information:        www.breastfeedinginc.ca  [facility] is a call center that provides information about the safety of taking medications while breastfeeding.  Call [phone], M-F, 8am-5pm, [place].  International Lactation Consultant Association provides resources for assistance:        www.ilca.org  [facility] Breastfeeding Coalition provides informationand resources for parents  and the community    http://[facility] Healthastfeeding.org     Yasmin Chery is a mom-to-mom support group:                             www.nolanesting.[employer]//breastfeedng-support/  Partners for Healthy Babies:  9-311-591-BABY(3920)  [employer]: a breastfeeding support group for women of color, 218.317.8679  Preparation and Hygiene:    1. Shower daily.  2. Wear a clean bra each day and wash daily in warm soapy water.  3. Change wet or moist breast pads frequently.  Moist pads can promote growth of germs.  4. Actively wash your hands, paying close attention to the area around and under your fingernails, thoroughly with soap and water for 15 seconds before pumping or handling your  milk.  Re-wash your hands if you touch anything (scratching your nose, answering the phone, etc) while pumping or handling your milk.   5. Before pumping your breasts, assemble the pump collection kit and have ready the sterile container and labels.  Place these items on a clean surface next to the breastpump.  6. Each time after you have finished pumping, take apart all of the parts of the breastpump collection kit and place them in a separate cleaning container (do not place them in the sink).  Be sure to remove the yellow valve from the breastshield and separate the white membrane from the yellow valve.  Rinse all of these parts with cool water.  Then use a new sponge and/or bottle brush and dishwashing detergent to clean the parts.  Rinse off the soapy water with cool water and air dry on a clean towel covered with a clean cloth.  All parts may also be washed after each use in the top rack of a .  7. Once each day, sterilize all of the parts of the breastpump collection kit.  This can be done by boiling the kit parts for 10 minutes or by using a Quick Clean Micro-Steam Bag made by [employer]  8. If condensation appears in the tubing, continue to run the pump with the tubing attached for 1-2 minutes or until the tubing is dry.   9. Notify your babys nurse or doctor if you become ill or need to take any medication, even over-the-counter medicines.        Collection and Storage of Expressed Breastmilk:         1. Pump your breasts at least 8-10 times every 24 hours.  Double pump (both breasts at  the same time) for at least 15-20 minutes using the most suction that is comfortable.    2. Write the date and time of pumping and the name of any medications you are takingon the babys pre-printed hospital identification label.   3. Place your babys pre-printed hospital identification label on each container of breastmilk.  Additional pre-printed labels can be obtained from your babys nurse.  If your  expressed breastmilk is not correctly labeled, the nurse cannot feed the milk to the baby.       4. Place a brightly colored sticker on the top of each container of milk pumped during the first 30 days.  This identifies the milk as special and having higher levels of nutrients and anti-infective properties that are so important for your baby.  Additional stickers can be obtained from the lactation consultants or your babys nurse.  5.   Do not touch the inside of the storage containers or lids.  6.      Pour the amount of expressed milk needed for 1 of your babys feedings into each   storage container. Use a new container(s) for each pumping.  Additional storage   containers can be obtained from your babys nurse.        7.       Tightly screw the lid onto the container and place immediately into the       refrigerator fordaily transportation to the hospital.   Do not freeze your milk      unless asked to do so by your babys nurse.  However, if you are not able to      visit your baby each day, place the expressed breastmilk in the freezer.  8.   Expressed breastmilk should be refrigerated or frozen within 1 hour of      pumping.  9.      Do not store expressed breastmilk on the door of your refrigerator or freezer where the temperature is warmer.         Transportation of Expressed Breastmilk:    1. Refrigerated breastmilk or frozen milk should be packed tightly together in a cooler with frozen, blue gel-packs to keep the milk frozen.  DO NOT USE ICE CUBES (WET ICE) TO TRANSPORT FROZEN MILK.   A clean towel can be used to fill any extra space between containers of frozen milk.  2.    Bring your expressed milk from home each time you visit the baby.

## 2017-06-30 NOTE — PLAN OF CARE
Problem: Breastfeeding (Adult,Obstetrics,Pediatric)  Goal: Signs and Symptoms of Listed Potential Problems Will be Absent, Minimized or Managed (Breastfeeding)  Signs and symptoms of listed potential problems will be absent, minimized or managed by discharge/transition of care (reference Breastfeeding (Adult,Obstetrics,Pediatric) CPG).    06/30/17 1156   Breastfeeding   Problems Assessed (Breastfeeding) all   Problems Present (Breastfeeding) other (see comments)   Follow discharge education. Mother chooses to nurse/supplement with formula. Encouraged pumping after feedings for extra stimulation and  As needed. Call for latch assessment.

## 2017-07-01 ENCOUNTER — TELEPHONE (OUTPATIENT)
Dept: OBSTETRICS AND GYNECOLOGY | Facility: HOSPITAL | Age: 37
End: 2017-07-01

## 2017-07-01 ENCOUNTER — NURSE TRIAGE (OUTPATIENT)
Dept: ADMINISTRATIVE | Facility: CLINIC | Age: 37
End: 2017-07-01

## 2017-07-01 NOTE — TELEPHONE ENCOUNTER
"  Reason for Disposition   [1] MODERATE headache (e.g., interferes with normal activities) AND [2] present > 24 hours AND [3] unexplained  (Exceptions: analgesics not tried, typical migraine, or headache part of viral illness)    Answer Assessment - Initial Assessment Questions  1. BLOOD PRESSURE: "What is the blood pressure?" "Did you take at least two measurements 5 minutes apart?"      142/92  2. ONSET: "When did you take your blood pressure?"      Yesterday   3. HOW: "How did you obtain the blood pressure?" (e.g., visiting nurse, automatic home BP monitor)      Home monitor   4. HISTORY: "Do you have a history of high blood pressure?"      No   5. MEDICATIONS: "Are you taking any medications for blood pressure?" "Have you missed any doses recently?"      No   6. OTHER SYMPTOMS: "Do you have any symptoms?" (e.g., headache, chest pain, blurred vision, difficulty breathing, weakness)      No   7. PREGNANCY: "Is there any chance you are pregnant?" "When was your last menstrual period?"     Recent     Answer Assessment - Initial Assessment Questions  1. LOCATION: "Where does it hurt?"       Head   2. ONSET: "When did the headache start?" (Minutes, hours or days)       Today   3. PATTERN: "Does the pain come and go, or has it been constant since it started?"      --  4. SEVERITY: "How bad is the pain?" and "What does it keep you from doing?"  (e.g., Scale 1-10; mild, moderate, or severe)    - MILD (1-3): doesn't interfere with normal activities     - MODERATE (4-7): interferes with normal activities or awakens from sleep     - SEVERE (8-10): excruciating pain, unable to do any normal activities         Moderate   5. RECURRENT SYMPTOM: "Have you ever had headaches before?" If so, ask: "When was the last time?" and "What happened that time?"       No   6. CAUSE: "What do you think is causing the headache?"      Unsure, started in hospital prior to discharge   7. MIGRAINE: "Have you been diagnosed with migraine " "headaches?" If so, ask: "Is this headache similar?"       --  8. HEAD INJURY: "Has there been any recent injury to the head?"       --  9. OTHER SYMPTOMS: "Do you have any other symptoms?" (fever, stiff neck, eye pain, sore throat, cold symptoms)      No   10. PREGNANCY: "Is there any chance you are pregnant?" "When was your last menstrual period?"        Recent     Protocols used: ST HEADACHE-A-AH, ST HIGH BLOOD PRESSURE-A-    "

## 2017-07-01 NOTE — TELEPHONE ENCOUNTER
Additional Information   Commented on: [1] MODERATE headache (e.g., interferes with normal activities) AND [2] present > 24 hours AND [3] unexplained  (Exceptions: analgesics not tried, typical migraine, or headache part of viral illness)     On-call provider paged for notification/advisement.    Protocols used: ST HEADACHE-A-AH

## 2017-07-01 NOTE — TELEPHONE ENCOUNTER
Patient complains of 5/10 headache that is persistent despite motrin and percocet. Took BP at home and it was 142/92. I asked patient to take a repeat pressure on the phone, and it was 115/70.     Patient lives in [place], [place]. I encouraged patient to go to the ED to be evaluated to rule out PreE. She will go to the ED in [place].    Solo Garcia MD  PGY-2 OB/GYN  [phone]    Discussed plan with on call staff who was in agreement.

## 2017-07-01 NOTE — TELEPHONE ENCOUNTER
Reason for Disposition   [1] BP  >= 140/90 AND [2] not taking BP medications    Protocols used: ST HIGH BLOOD PRESSURE-A-AH

## 2017-08-09 ENCOUNTER — POSTPARTUM VISIT (OUTPATIENT)
Dept: OBSTETRICS AND GYNECOLOGY | Facility: CLINIC | Age: 37
End: 2017-08-09
Payer: COMMERCIAL

## 2017-08-09 VITALS
DIASTOLIC BLOOD PRESSURE: 98 MMHG | SYSTOLIC BLOOD PRESSURE: 146 MMHG | HEIGHT: 63 IN | WEIGHT: 177 LBS | BODY MASS INDEX: 31.36 KG/M2

## 2017-08-09 DIAGNOSIS — Z30.017 ENCOUNTER FOR INITIAL PRESCRIPTION OF IMPLANTABLE SUBDERMAL CONTRACEPTIVE: ICD-10-CM

## 2017-08-09 DIAGNOSIS — I10 HTN (HYPERTENSION), BENIGN: ICD-10-CM

## 2017-08-09 PROBLEM — O26.12 LOW WEIGHT GAIN DURING PREGNANCY IN SECOND TRIMESTER: Status: RESOLVED | Noted: 2017-03-30 | Resolved: 2017-08-09

## 2017-08-09 PROBLEM — Z98.891 S/P C-SECTION: Status: RESOLVED | Noted: 2017-06-26 | Resolved: 2017-08-09

## 2017-08-09 PROCEDURE — 99999 PR PBB SHADOW E&M-EST. PATIENT-LVL II: CPT | Mod: PBBFAC,,, | Performed by: OBSTETRICS & GYNECOLOGY

## 2017-08-09 PROCEDURE — 0503F POSTPARTUM CARE VISIT: CPT | Mod: S$GLB,,, | Performed by: OBSTETRICS & GYNECOLOGY

## 2017-08-09 RX ORDER — ACETAMINOPHEN AND CODEINE PHOSPHATE 120; 12 MG/5ML; MG/5ML
1 SOLUTION ORAL DAILY
Qty: 30 TABLET | Refills: 12 | Status: SHIPPED | OUTPATIENT
Start: 2017-08-09 | End: 2017-10-19

## 2017-08-09 NOTE — PROGRESS NOTES
PT HERE S/P R C/S. FEELS WELL.  THINKING ABOUT [place].    ROS:  GENERAL: No fever, chills, fatigability or weight loss.  VULVAR: No pain, no lesions and no itching.  VAGINAL: No relaxation, no itching, no discharge, no abnormal bleeding and no lesions.  ABDOMEN: No abdominal pain. Denies nausea. Denies vomiting. No diarrhea. No constipation  BREAST: Denies pain. No lumps. No discharge.  URINARY: No incontinence, no nocturia, no frequency and no dysuria.  CARDIOVASCULAR: No chest pain. No shortness of breath. No leg cramps.  NEUROLOGICAL: No headaches. No vision changes.  The remainder of the review of systems was negative.    PE:  General Appearance: overweight And Well developed. Well nourished. In no acute distress.  Vulva: Lesions: No.  Urethral Meatus: Normal size. Normal location. No lesions. No prolapse.  Urethra: No masses. No tenderness. No prolapse. No scarring.  Bladder: No masses. No tenderness.  Vagina: Mucosa NI:yes discharge no, atrophy no, cystocele no or rectocele no.  Cervix: Lesion: no  Stenotic: no Cervical motion tenderness: no  Uterus: Uterus size: 6 weeks. Support good. Uterus size: Normal  Adnexa: Masses: No Tenderness: No CDS Nodularity: No  Abdomen: overweight No masses. No tenderness. HEALED INCISION      PROCEDURES:    PLAN:     DIAGNOSIS:  1. Postpartum care and examination immediately after delivery    2. Encounter for initial prescription of implantable subdermal contraceptive    3. HTN (hypertension), benign        MEDICATIONS & ORDERS:  Orders Placed This Encounter    norethindrone (MICRONOR) 0.35 mg tablet       Patient was counseled today on the new ACS guidelines for cervical cytology screening as well as the current recommendations for breast cancer screening. She was counseled to follow up with her PCP for other routine health maintenance. Counseling session lasted approximately 10 minutes, and all her questions were answered.     20 MIN D/W PT ON CONTRACEPTION    FOLLOW-UP:  With me in [place]   PCP FOR B12 AND HTN.

## 2017-09-18 ENCOUNTER — TELEPHONE (OUTPATIENT)
Dept: OBSTETRICS AND GYNECOLOGY | Facility: CLINIC | Age: 37
End: 2017-09-18

## 2017-09-18 NOTE — TELEPHONE ENCOUNTER
----- Message from Yasmeen Douglas sent at 9/18/2017 10:28 AM CDT -----  Contact: pt  X_  1st Request  _  2nd Request  _  3rd Request    Who:JO ANN PANIAGUA [[id number]]    Why: Patient states she would like a call back to schedule her surgery.... Please contact to further discuss and advise     What Number to Call Back: [phone]    When to Expect a call back: (Before the end of the day)   -- if call after 3:00 call back will be tomorrow.

## 2017-09-21 ENCOUNTER — TELEPHONE (OUTPATIENT)
Dept: OBSTETRICS AND GYNECOLOGY | Facility: CLINIC | Age: 37
End: 2017-09-21

## 2017-09-21 DIAGNOSIS — Z30.2 STERILIZATION: Primary | ICD-10-CM

## 2017-10-19 DIAGNOSIS — Z30.2 ADMISSION FOR TUBAL LIGATION: Primary | ICD-10-CM

## 2017-10-19 RX ORDER — LOSARTAN POTASSIUM 100 MG/1
100 TABLET ORAL DAILY
COMMUNITY
End: 2022-12-01

## 2017-10-19 RX ORDER — MECLIZINE HYDROCHLORIDE 25 MG/1
25 TABLET ORAL 3 TIMES DAILY PRN
COMMUNITY
End: 2023-01-18

## 2017-10-19 RX ORDER — CYANOCOBALAMIN 1000 UG/ML
1000 INJECTION, SOLUTION INTRAMUSCULAR; SUBCUTANEOUS
COMMUNITY
End: 2022-12-01

## 2017-10-19 RX ORDER — HYDROCHLOROTHIAZIDE 25 MG/1
25 TABLET ORAL DAILY
COMMUNITY
End: 2022-12-01

## 2017-10-20 ENCOUNTER — SURGERY (OUTPATIENT)
Age: 37
End: 2017-10-20

## 2017-10-20 ENCOUNTER — ANESTHESIA (OUTPATIENT)
Dept: SURGERY | Facility: OTHER | Age: 37
End: 2017-10-20
Payer: COMMERCIAL

## 2017-10-20 ENCOUNTER — HOSPITAL ENCOUNTER (OUTPATIENT)
Facility: OTHER | Age: 37
Discharge: HOME | End: 2017-10-21
Attending: OBSTETRICS & GYNECOLOGY | Admitting: OBSTETRICS & GYNECOLOGY
Payer: COMMERCIAL

## 2017-10-20 ENCOUNTER — ANESTHESIA EVENT (OUTPATIENT)
Dept: SURGERY | Facility: OTHER | Age: 37
End: 2017-10-20
Payer: COMMERCIAL

## 2017-10-20 DIAGNOSIS — Z90.79 STATUS POST BILATERAL SALPINGECTOMY: Primary | ICD-10-CM

## 2017-10-20 DIAGNOSIS — Z30.2 ADMISSION FOR TUBAL LIGATION: ICD-10-CM

## 2017-10-20 PROBLEM — R33.9 URINARY RETENTION: Status: ACTIVE | Noted: 2017-10-20

## 2017-10-20 LAB
B-HCG UR QL: NEGATIVE
BASOPHILS # BLD AUTO: 0.05 K/UL
BASOPHILS NFR BLD: 1 %
CTP QC/QA: YES
DIFFERENTIAL METHOD: ABNORMAL
EOSINOPHIL # BLD AUTO: 0.1 K/UL
EOSINOPHIL NFR BLD: 1.6 %
ERYTHROCYTE [DISTWIDTH] IN BLOOD BY AUTOMATED COUNT: 17.4 %
HCT VFR BLD AUTO: 34.1 %
HGB BLD-MCNC: 10.6 G/DL
LYMPHOCYTES # BLD AUTO: 2.2 K/UL
LYMPHOCYTES NFR BLD: 45.4 %
MCH RBC QN AUTO: 24.4 PG
MCHC RBC AUTO-ENTMCNC: 31.1 G/DL
MCV RBC AUTO: 78 FL
MONOCYTES # BLD AUTO: 0.5 K/UL
MONOCYTES NFR BLD: 9.4 %
NEUTROPHILS # BLD AUTO: 2.1 K/UL
NEUTROPHILS NFR BLD: 42.6 %
PLATELET # BLD AUTO: 300 K/UL
PMV BLD AUTO: 10.5 FL
RBC # BLD AUTO: 4.35 M/UL
WBC # BLD AUTO: 4.87 K/UL

## 2017-10-20 PROCEDURE — 85025 COMPLETE CBC W/AUTO DIFF WBC: CPT

## 2017-10-20 PROCEDURE — 37000009 HC ANESTHESIA EA ADD 15 MINS: Performed by: OBSTETRICS & GYNECOLOGY

## 2017-10-20 PROCEDURE — 63600175 PHARM REV CODE 636 W HCPCS: Performed by: ANESTHESIOLOGY

## 2017-10-20 PROCEDURE — 25000003 PHARM REV CODE 250: Performed by: OBSTETRICS & GYNECOLOGY

## 2017-10-20 PROCEDURE — 58661 LAPAROSCOPY REMOVE ADNEXA: CPT | Mod: 50,,, | Performed by: OBSTETRICS & GYNECOLOGY

## 2017-10-20 PROCEDURE — 37000008 HC ANESTHESIA 1ST 15 MINUTES: Performed by: OBSTETRICS & GYNECOLOGY

## 2017-10-20 PROCEDURE — 71000016 HC POSTOP RECOV ADDL HR: Performed by: OBSTETRICS & GYNECOLOGY

## 2017-10-20 PROCEDURE — 88302 TISSUE EXAM BY PATHOLOGIST: CPT | Mod: 26,,, | Performed by: PATHOLOGY

## 2017-10-20 PROCEDURE — 71000039 HC RECOVERY, EACH ADD'L HOUR: Performed by: OBSTETRICS & GYNECOLOGY

## 2017-10-20 PROCEDURE — 94799 UNLISTED PULMONARY SVC/PX: CPT

## 2017-10-20 PROCEDURE — 25000003 PHARM REV CODE 250: Performed by: ANESTHESIOLOGY

## 2017-10-20 PROCEDURE — 71000015 HC POSTOP RECOV 1ST HR: Performed by: OBSTETRICS & GYNECOLOGY

## 2017-10-20 PROCEDURE — 81025 URINE PREGNANCY TEST: CPT | Performed by: OBSTETRICS & GYNECOLOGY

## 2017-10-20 PROCEDURE — 25000003 PHARM REV CODE 250: Performed by: NURSE ANESTHETIST, CERTIFIED REGISTERED

## 2017-10-20 PROCEDURE — 88302 TISSUE EXAM BY PATHOLOGIST: CPT | Performed by: PATHOLOGY

## 2017-10-20 PROCEDURE — 36000708 HC OR TIME LEV III 1ST 15 MIN: Performed by: OBSTETRICS & GYNECOLOGY

## 2017-10-20 PROCEDURE — 27201423 OPTIME MED/SURG SUP & DEVICES STERILE SUPPLY: Performed by: OBSTETRICS & GYNECOLOGY

## 2017-10-20 PROCEDURE — 94761 N-INVAS EAR/PLS OXIMETRY MLT: CPT

## 2017-10-20 PROCEDURE — 36000709 HC OR TIME LEV III EA ADD 15 MIN: Performed by: OBSTETRICS & GYNECOLOGY

## 2017-10-20 PROCEDURE — 63600175 PHARM REV CODE 636 W HCPCS: Performed by: NURSE ANESTHETIST, CERTIFIED REGISTERED

## 2017-10-20 PROCEDURE — 71000033 HC RECOVERY, INTIAL HOUR: Performed by: OBSTETRICS & GYNECOLOGY

## 2017-10-20 PROCEDURE — 25000003 PHARM REV CODE 250: Performed by: STUDENT IN AN ORGANIZED HEALTH CARE EDUCATION/TRAINING PROGRAM

## 2017-10-20 RX ORDER — DEXTROSE MONOHYDRATE, SODIUM CHLORIDE, AND POTASSIUM CHLORIDE 50; 1.49; 4.5 G/1000ML; G/1000ML; G/1000ML
INJECTION, SOLUTION INTRAVENOUS CONTINUOUS
Status: DISCONTINUED | OUTPATIENT
Start: 2017-10-20 | End: 2017-10-20

## 2017-10-20 RX ORDER — LIDOCAINE HCL/PF 100 MG/5ML
SYRINGE (ML) INTRAVENOUS
Status: DISCONTINUED | OUTPATIENT
Start: 2017-10-20 | End: 2017-10-20

## 2017-10-20 RX ORDER — ONDANSETRON 8 MG/1
8 TABLET, ORALLY DISINTEGRATING ORAL EVERY 8 HOURS PRN
Status: DISCONTINUED | OUTPATIENT
Start: 2017-10-20 | End: 2017-10-21 | Stop reason: HOSPADM

## 2017-10-20 RX ORDER — MEPERIDINE HYDROCHLORIDE 50 MG/ML
12.5 INJECTION INTRAMUSCULAR; INTRAVENOUS; SUBCUTANEOUS ONCE AS NEEDED
Status: DISCONTINUED | OUTPATIENT
Start: 2017-10-20 | End: 2017-10-20 | Stop reason: HOSPADM

## 2017-10-20 RX ORDER — IBUPROFEN 800 MG/1
800 TABLET ORAL EVERY 8 HOURS PRN
Qty: 30 TABLET | Refills: 2 | Status: SHIPPED | OUTPATIENT
Start: 2017-10-20 | End: 2022-12-01

## 2017-10-20 RX ORDER — ONDANSETRON 2 MG/ML
INJECTION INTRAMUSCULAR; INTRAVENOUS
Status: DISCONTINUED | OUTPATIENT
Start: 2017-10-20 | End: 2017-10-20

## 2017-10-20 RX ORDER — DEXAMETHASONE SODIUM PHOSPHATE 4 MG/ML
INJECTION, SOLUTION INTRA-ARTICULAR; INTRALESIONAL; INTRAMUSCULAR; INTRAVENOUS; SOFT TISSUE
Status: DISCONTINUED | OUTPATIENT
Start: 2017-10-20 | End: 2017-10-20

## 2017-10-20 RX ORDER — SODIUM CHLORIDE 0.9 % (FLUSH) 0.9 %
3 SYRINGE (ML) INJECTION
Status: DISCONTINUED | OUTPATIENT
Start: 2017-10-20 | End: 2017-10-21 | Stop reason: HOSPADM

## 2017-10-20 RX ORDER — DIPHENHYDRAMINE HYDROCHLORIDE 50 MG/ML
25 INJECTION INTRAMUSCULAR; INTRAVENOUS ONCE
Status: COMPLETED | OUTPATIENT
Start: 2017-10-20 | End: 2017-10-20

## 2017-10-20 RX ORDER — HYDROCODONE BITARTRATE AND ACETAMINOPHEN 5; 325 MG/1; MG/1
1 TABLET ORAL EVERY 4 HOURS PRN
Status: DISCONTINUED | OUTPATIENT
Start: 2017-10-20 | End: 2017-10-21 | Stop reason: HOSPADM

## 2017-10-20 RX ORDER — ONDANSETRON 2 MG/ML
4 INJECTION INTRAMUSCULAR; INTRAVENOUS DAILY PRN
Status: DISCONTINUED | OUTPATIENT
Start: 2017-10-20 | End: 2017-10-20 | Stop reason: HOSPADM

## 2017-10-20 RX ORDER — HYDROCODONE BITARTRATE AND ACETAMINOPHEN 5; 325 MG/1; MG/1
1 TABLET ORAL EVERY 4 HOURS PRN
Status: DISCONTINUED | OUTPATIENT
Start: 2017-10-20 | End: 2017-10-20

## 2017-10-20 RX ORDER — HYDROMORPHONE HYDROCHLORIDE 2 MG/ML
0.4 INJECTION, SOLUTION INTRAMUSCULAR; INTRAVENOUS; SUBCUTANEOUS EVERY 5 MIN PRN
Status: DISCONTINUED | OUTPATIENT
Start: 2017-10-20 | End: 2017-10-20 | Stop reason: HOSPADM

## 2017-10-20 RX ORDER — IBUPROFEN 600 MG/1
600 TABLET ORAL EVERY 6 HOURS
Status: DISCONTINUED | OUTPATIENT
Start: 2017-10-21 | End: 2017-10-21 | Stop reason: HOSPADM

## 2017-10-20 RX ORDER — SODIUM CHLORIDE, SODIUM LACTATE, POTASSIUM CHLORIDE, CALCIUM CHLORIDE 600; 310; 30; 20 MG/100ML; MG/100ML; MG/100ML; MG/100ML
INJECTION, SOLUTION INTRAVENOUS CONTINUOUS PRN
Status: DISCONTINUED | OUTPATIENT
Start: 2017-10-20 | End: 2017-10-20

## 2017-10-20 RX ORDER — IBUPROFEN 600 MG/1
600 TABLET ORAL EVERY 6 HOURS PRN
Status: DISCONTINUED | OUTPATIENT
Start: 2017-10-20 | End: 2017-10-21 | Stop reason: HOSPADM

## 2017-10-20 RX ORDER — FENTANYL CITRATE 50 UG/ML
25 INJECTION, SOLUTION INTRAMUSCULAR; INTRAVENOUS EVERY 5 MIN PRN
Status: COMPLETED | OUTPATIENT
Start: 2017-10-20 | End: 2017-10-20

## 2017-10-20 RX ORDER — MIDAZOLAM HYDROCHLORIDE 1 MG/ML
INJECTION INTRAMUSCULAR; INTRAVENOUS
Status: DISCONTINUED | OUTPATIENT
Start: 2017-10-20 | End: 2017-10-20

## 2017-10-20 RX ORDER — DIPHENHYDRAMINE HCL 25 MG
25 CAPSULE ORAL EVERY 4 HOURS PRN
Status: DISCONTINUED | OUTPATIENT
Start: 2017-10-20 | End: 2017-10-21 | Stop reason: HOSPADM

## 2017-10-20 RX ORDER — FENTANYL CITRATE 50 UG/ML
INJECTION, SOLUTION INTRAMUSCULAR; INTRAVENOUS
Status: DISCONTINUED | OUTPATIENT
Start: 2017-10-20 | End: 2017-10-20

## 2017-10-20 RX ORDER — HYDROCODONE BITARTRATE AND ACETAMINOPHEN 10; 325 MG/1; MG/1
1 TABLET ORAL EVERY 4 HOURS PRN
Status: DISCONTINUED | OUTPATIENT
Start: 2017-10-20 | End: 2017-10-20

## 2017-10-20 RX ORDER — AMOXICILLIN 250 MG
1 CAPSULE ORAL 2 TIMES DAILY
Status: DISCONTINUED | OUTPATIENT
Start: 2017-10-20 | End: 2017-10-21 | Stop reason: HOSPADM

## 2017-10-20 RX ORDER — HYDROCODONE BITARTRATE AND ACETAMINOPHEN 5; 325 MG/1; MG/1
1 TABLET ORAL EVERY 4 HOURS PRN
Qty: 14 TABLET | Refills: 0 | Status: SHIPPED | OUTPATIENT
Start: 2017-10-20 | End: 2022-12-01

## 2017-10-20 RX ORDER — PROPOFOL 10 MG/ML
VIAL (ML) INTRAVENOUS
Status: DISCONTINUED | OUTPATIENT
Start: 2017-10-20 | End: 2017-10-20

## 2017-10-20 RX ORDER — DIPHENHYDRAMINE HYDROCHLORIDE 50 MG/ML
25 INJECTION INTRAMUSCULAR; INTRAVENOUS EVERY 4 HOURS PRN
Status: DISCONTINUED | OUTPATIENT
Start: 2017-10-20 | End: 2017-10-21 | Stop reason: HOSPADM

## 2017-10-20 RX ORDER — NEOSTIGMINE METHYLSULFATE 1 MG/ML
INJECTION, SOLUTION INTRAVENOUS
Status: DISCONTINUED | OUTPATIENT
Start: 2017-10-20 | End: 2017-10-20

## 2017-10-20 RX ORDER — SCOLOPAMINE TRANSDERMAL SYSTEM 1 MG/1
PATCH, EXTENDED RELEASE TRANSDERMAL
Status: DISCONTINUED | OUTPATIENT
Start: 2017-10-20 | End: 2017-10-20

## 2017-10-20 RX ORDER — HYDROCODONE BITARTRATE AND ACETAMINOPHEN 10; 325 MG/1; MG/1
1 TABLET ORAL EVERY 4 HOURS PRN
Status: DISCONTINUED | OUTPATIENT
Start: 2017-10-20 | End: 2017-10-21 | Stop reason: HOSPADM

## 2017-10-20 RX ORDER — OXYCODONE HYDROCHLORIDE 5 MG/1
5 TABLET ORAL
Status: DISCONTINUED | OUTPATIENT
Start: 2017-10-20 | End: 2017-10-20 | Stop reason: HOSPADM

## 2017-10-20 RX ORDER — GLYCOPYRROLATE 0.2 MG/ML
INJECTION INTRAMUSCULAR; INTRAVENOUS
Status: DISCONTINUED | OUTPATIENT
Start: 2017-10-20 | End: 2017-10-20

## 2017-10-20 RX ADMIN — HYDROCODONE BITARTRATE AND ACETAMINOPHEN 1 TABLET: 5; 325 TABLET ORAL at 10:10

## 2017-10-20 RX ADMIN — SCOPALAMINE 1 PATCH: 1 PATCH, EXTENDED RELEASE TRANSDERMAL at 12:10

## 2017-10-20 RX ADMIN — ONDANSETRON 4 MG: 2 INJECTION INTRAMUSCULAR; INTRAVENOUS at 01:10

## 2017-10-20 RX ADMIN — MIDAZOLAM HYDROCHLORIDE 2 MG: 1 INJECTION, SOLUTION INTRAMUSCULAR; INTRAVENOUS at 12:10

## 2017-10-20 RX ADMIN — FENTANYL CITRATE 100 MCG: 50 INJECTION, SOLUTION INTRAMUSCULAR; INTRAVENOUS at 12:10

## 2017-10-20 RX ADMIN — GLYCOPYRROLATE 0.2 MG: 0.2 INJECTION, SOLUTION INTRAMUSCULAR; INTRAVENOUS at 12:10

## 2017-10-20 RX ADMIN — CARBOXYMETHYLCELLULOSE SODIUM 2 DROP: 2.5 SOLUTION/ DROPS OPHTHALMIC at 12:10

## 2017-10-20 RX ADMIN — FENTANYL CITRATE 25 MCG: 50 INJECTION INTRAMUSCULAR; INTRAVENOUS at 01:10

## 2017-10-20 RX ADMIN — SODIUM CHLORIDE, SODIUM LACTATE, POTASSIUM CHLORIDE, AND CALCIUM CHLORIDE: 600; 310; 30; 20 INJECTION, SOLUTION INTRAVENOUS at 11:10

## 2017-10-20 RX ADMIN — NEOSTIGMINE METHYLSULFATE 5 MG: 1 INJECTION INTRAVENOUS at 01:10

## 2017-10-20 RX ADMIN — SODIUM CHLORIDE, SODIUM LACTATE, POTASSIUM CHLORIDE, AND CALCIUM CHLORIDE: 600; 310; 30; 20 INJECTION, SOLUTION INTRAVENOUS at 01:10

## 2017-10-20 RX ADMIN — LIDOCAINE HYDROCHLORIDE 90 MG: 20 INJECTION, SOLUTION INTRAVENOUS at 12:10

## 2017-10-20 RX ADMIN — DIPHENHYDRAMINE HYDROCHLORIDE 25 MG: 50 INJECTION, SOLUTION INTRAMUSCULAR; INTRAVENOUS at 01:10

## 2017-10-20 RX ADMIN — HYDROMORPHONE HYDROCHLORIDE 0.4 MG: 2 INJECTION INTRAMUSCULAR; INTRAVENOUS; SUBCUTANEOUS at 02:10

## 2017-10-20 RX ADMIN — GLYCOPYRROLATE 0.8 MG: 0.2 INJECTION, SOLUTION INTRAMUSCULAR; INTRAVENOUS at 01:10

## 2017-10-20 RX ADMIN — DEXAMETHASONE SODIUM PHOSPHATE 8 MG: 4 INJECTION, SOLUTION INTRAMUSCULAR; INTRAVENOUS at 12:10

## 2017-10-20 RX ADMIN — HYDROMORPHONE HYDROCHLORIDE 0.4 MG: 2 INJECTION INTRAMUSCULAR; INTRAVENOUS; SUBCUTANEOUS at 01:10

## 2017-10-20 RX ADMIN — Medication 800 MG: at 12:10

## 2017-10-20 RX ADMIN — OXYCODONE HYDROCHLORIDE 5 MG: 5 TABLET ORAL at 01:10

## 2017-10-20 RX ADMIN — PROPOFOL 200 MG: 10 INJECTION, EMULSION INTRAVENOUS at 12:10

## 2017-10-20 RX ADMIN — STANDARDIZED SENNA CONCENTRATE AND DOCUSATE SODIUM 1 TABLET: 8.6; 5 TABLET, FILM COATED ORAL at 10:10

## 2017-10-20 NOTE — DISCHARGE INSTRUCTIONS
Abdominal Laparoscopy Discharge Instructions      Activity  · Limit your activity for 4-6 weeks.  · Dont lift anything heavier than 5-10 pounds.  · Avoid strenuous activities, such as mowing the lawn, vacuuming, or playing sports.  · Limit your activity to short, slow walks. Gradually increase your pace and distance as you feel able.  · Listen to your body. If an activity causes pain, stop.  · Rest when you are tired.  · Dont have sexual intercourse or use tampons or douches until your doctor says its safe to do so.    Home Care  · Always keep your incision clean and dry.  · Shower as instructed. You may wash your incision gently with mild soap and warm water and pat dry  · If you have steri strips they should fall off in 7-10 days.    · If you have band aids covering your incisions change daily or when soiled.  The band aids may be removed post op day 1 if they are clean and dry.  · Take your medication exactly as instructed by your doctor.  · Return to your diet as you feel able. Eat a healthy, well-balanced diet.  · Avoid constipation.  ¨ Use laxatives, stool softeners, or enemas as directed by your doctor.  ¨ Eat more high-fiber foods.  ¨ Drink 6-8 glasses of water every day, unless directed otherwise.  Follow-Up  Make a follow-up appointment as directed by our staff.       When to Call Your Doctor  Call your doctor right away if you have any of the following:  · Fever above 101.5°F  (38.6°C) or chills  · Bright red vaginal bleeding or a foul-smelling discharge  · Vaginal bleeding that soaks more than one sanitary pad per hour  · Trouble urinating or burning sensation when you urinate  · Severe abdominal pain or bloating  · Redness, swelling, or drainage at your incision site  · Shortness of breath        ________________________________________________________________  FOR EMERGENCIES:  If any unusual problems or difficulties occur, contact Dr. Foster or the resident at [phone] (page ) or at the  Clinic office, [phone].

## 2017-10-20 NOTE — TRANSFER OF CARE
"Anesthesia Transfer of Care Note    Patient: Silvia Bustamante    Procedure(s) Performed: Procedure(s) (LRB):  SALPINGO-LAPAROSCOPIC/ JUST TUBES NOT OVARIES (Bilateral)    Patient location: PACU    Anesthesia Type: general    Transport from OR: Transported from OR on 2-3 L/min O2 by NC with adequate spontaneous ventilation    Post pain: adequate analgesia    Post assessment: no apparent anesthetic complications and tolerated procedure well    Post vital signs: stable    Level of consciousness: awake    Nausea/Vomiting: no nausea/vomiting    Complications: none    Transfer of care protocol was followed      Last vitals:   Visit Vitals  Ht 5' 4" (1.626 m)   Wt 77.6 kg (171 lb)   Breastfeeding? No   BMI 29.35 kg/m²     "

## 2017-10-20 NOTE — ANESTHESIA PREPROCEDURE EVALUATION
10/20/2017  Silvia Bustamante is a 37 y.o., female.    Anesthesia Evaluation    I have reviewed the Patient Summary Reports.    I have reviewed the Nursing Notes.   I have reviewed the Medications.     Review of Systems  Anesthesia Hx:  Denies Family Hx of Anesthesia complications.  Personal Hx of Anesthesia complications, Post-Operative Nausea/Vomiting   Hematology/Oncology:         -- Anemia:   Cardiovascular:   Hypertension    Pulmonary:  Pulmonary Normal    Renal/:  Renal/ Normal     Hepatic/GI:  Hepatic/GI Normal Gastric Bypass   Neurological:  Neurology Normal    Endocrine:  Endocrine Normal        Physical Exam  General:  Well nourished    Airway/Jaw/Neck:  Airway Findings: Mouth Opening: Normal Tongue: Normal  General Airway Assessment: Adult  Mallampati: II  TM Distance: Normal, at least 6 cm      Dental:  Dental Findings:         Mental Status:  Mental Status Findings:  Alert and Oriented, Cooperative         Anesthesia Plan  Type of Anesthesia, risks & benefits discussed:  Anesthesia Type:  general  Patient's Preference:   Intra-op Monitoring Plan: standard ASA monitors  Intra-op Monitoring Plan Comments:   Post Op Pain Control Plan:   Post Op Pain Control Plan Comments:   Induction:   IV  Beta Blocker:         Informed Consent: Patient understands risks and agrees with Anesthesia plan.  Questions answered. Anesthesia consent signed with patient.  ASA Score: 2     Day of Surgery Review of History & Physical:    H&P update referred to the surgeon.         Ready For Surgery From Anesthesia Perspective.

## 2017-10-20 NOTE — OR NURSING
Pt states pain tolerable. States itching better and tolerable. No other changes from previous assessment. Prepared for transfer to acu.

## 2017-10-20 NOTE — ANESTHESIA POSTPROCEDURE EVALUATION
"Anesthesia Post Evaluation    Patient: Silvia Bustamante    Procedure(s) Performed: Procedure(s) (LRB):  SALPINGO-LAPAROSCOPIC/ JUST TUBES NOT OVARIES (Bilateral)    Final Anesthesia Type: general  Patient location during evaluation: PACU  Patient participation: Yes- Able to Participate  Level of consciousness: awake and alert  Post-procedure vital signs: reviewed and stable  Pain management: adequate  Airway patency: patent  PONV status at discharge: No PONV  Anesthetic complications: no      Cardiovascular status: hemodynamically stable  Respiratory status: unassisted  Hydration status: euvolemic  Follow-up not needed.        Visit Vitals  BP (!) 102/56 (BP Location: Left arm, Patient Position: Lying)   Pulse (!) 52   Temp 36.6 °C (97.9 °F) (Oral)   Resp 16   Ht 5' 4" (1.626 m)   Wt 77.6 kg (171 lb)   SpO2 100%   Breastfeeding? No   BMI 29.35 kg/m²       Pain/Ashley Score: Pain Assessment Performed: Yes (10/20/2017 11:00 AM)  Presence of Pain: complains of pain/discomfort (10/20/2017  1:55 PM)  Pain Rating Prior to Med Admin: 7 (10/20/2017  1:45 PM)  Ashley Score: 9 (10/20/2017  1:55 PM)      "

## 2017-10-20 NOTE — H&P
PT HAS COMPLETED CHILDBEARING AND WISHES TO HAVE PERMANENT STERILIZATION.    ROS:  GENERAL: No fever, chills, fatigability or weight loss.  VULVAR: No pain, no lesions and no itching.  VAGINAL: No relaxation, no itching, no discharge, no abnormal bleeding and no lesions.  ABDOMEN: No abdominal pain. Denies nausea. Denies vomiting. No diarrhea. No constipation  BREAST: Denies pain. No lumps. No discharge.  URINARY: No incontinence, no nocturia, no frequency and no dysuria.  CARDIOVASCULAR: No chest pain. No shortness of breath. No leg cramps.  NEUROLOGICAL: No headaches. No vision changes.  The remainder of the review of systems was negative.    PE:  General Appearance: overweight And Well developed. Well nourished. In no acute distress.  Vulva: Lesions: No.  Urethral Meatus: Normal size. Normal location. No lesions. No prolapse.  Urethra: No masses. No tenderness. No prolapse. No scarring.  Bladder: No masses. No tenderness.  Vagina: Mucosa NI:yes discharge no, atrophy no, cystocele no or rectocele no.  Cervix: Lesion: no  Stenotic: no Cervical motion tenderness: no  Uterus: Uterus size: 6 weeks. Support good. Uterus size: Normal  Adnexa: Masses: No Tenderness: No CDS Nodularity: No  Abdomen: overweight No masses. No tenderness.  CHEST: CTA B  HEART: RRR  EXT: NO EDEMA        PROCEDURES:    PLAN:     DIAGNOSIS:  1. Admission for tubal ligation        MEDICATIONS & ORDERS:  Orders Placed This Encounter    CBC auto differential    Diet NPO    Up ad kandy    Notify physician if BS > 180 for hysterectomy patients    Place sequential compression device    Place CURTIS hose    Notify Physician - Potential Need of Opioid Reversal    Oxygen Continuous    Pulse Oximetry Q4H    POCT urine pregnancy    Place in Outpatient    POCT glucose    ibuprofen (CALDOLOR) 800mg/250mL D5W (READY TO MIX SYSTEM)    sodium chloride 0.9% flush 3 mL    Medium Risk of VTE    Case Request Operating Room: SALPINGO-LAPAROSCOPIC/ JUST  TUBES NOT OVARIES       Patient was counseled today on the new ACS guidelines for cervical cytology screening as well as the current recommendations for breast cancer screening. She was counseled to follow up with her PCP for other routine health maintenance. Counseling session lasted approximately 10 minutes, and all her questions were answered.         FOLLOW-UP: With me in 1 month

## 2017-10-20 NOTE — OP NOTE
Operative Report    Date of Surgery 10/20/2017    Procedure: Laparoscopic salpingectomy    Surgeon: Dr. Ralf Foster    Assistant: Candi Olivera MD (res)      Pre-Op Diagnosis: Undesired fertility    Post-op Diagnosis:  same    EBL: <5mL     IVF: 1000mL    Urine Output: 125mL     Specimen:   1. Bilateral fallopian tubes    Findings:   1. Normal external female genitalia, normal appearing cervix  2. Normal appearing uterus, tubes, and ovaries      Procedure in Detail:  Pt was taken back to OR where general anesthesia was found to be adequate. Pt was placed in dorsal lithotomy position in yellow-fin stirrups. Examination under anesthesia revealed a normal size uterus. The patient was prepared in normal sterile fashion. A speculum was placed into the vagina. The anterior lip of the cervix was grasped with a single-toothed tenaculum. The uterus sounded to 8 cm. The DANIELLE uterine manipulator was introduced without difficulty. A trent was placed at this time. The patient was draped in normal sterile fashion.   Rooks pickups were used to grasp the periumbilical skin, and a vertical incision was made. The Veress needle was introduced into the peritoneal cavity without difficulty. A saline drop test was preformed to validate intraperitoneal placement. The pneumoperitoneum was established with CO2 gas to a pressure of 15 mmHg. A 10 mm trocar was inserted into the abdomen. Intraabdominal placement was confirmed with laparoscope. A second 5 mm port was placed 3 cm above the left iliac crest and 3 cm medial. A third 5 mm port was placed 3 cm above the right iliac crest and 3 cm medial.     Pt was placed in Trendelenburg position to aid visualization. Intraabdominal survey revealed normal appearing pelvic anatomy and lack of any visceral or vascular injury. Normal pelvic anatomy was noted with surgical. No evidence of endometriosis. No adhesive disease. The left tube was elevated removed with the [place] device. The right tube  was removed in a similar fashion. The 5mm scope was inserted into the right lateral port and both tubes were removed through the 10 mm port. On evaluation the left mesosalpinx was noted to have minimal oozing which was controlled with bipolar cautery. The abdomen was desulfalted to 4mmHg and hemostasis was assured at both sites.     Trocars were removed. Hemostasis of incision was obtained with Bovie. Skin was closed with 4-0 Monocryl in subcuticular fashion. The DANIELLE was removed and hemostasis was noted on the cervix. Leon was removed prior to patient being moved to recovery. Patient tolerated the procedure well. All instruments, needles and sponge counts were correct times 2.      Candi Olivera MD    SEEN AND EXAMINED BY ME  AGREE WITH ABOVE    Ralf Foster MD

## 2017-10-20 NOTE — DISCHARGE SUMMARY
[facility]  Brief Operative Note     SUMMARY     Surgery Date: 10/20/2017     Surgeon(s) and Role:     * Ralf Foster Jr., MD - Primary     * Candi Olivera MD - Resident - Assisting        Pre-op Diagnosis:  Sterilization [Z30.2]    Post-op Diagnosis:  Post-Op Diagnosis Codes:     * Sterilization [Z30.2]    Procedure(s) (LRB):  SALPINGO-LAPAROSCOPIC/ JUST TUBES NOT OVARIES (Bilateral)    Anesthesia: General    Description of the findings of the procedure:   1) Normal cervix  2) Uterus, bilateral tubes and ovaries normal in appearance.    Estimated Blood Loss: <5mL         Specimens:   Specimen (12h ago through future)    Start     Ordered    10/20/17 1258  Specimen to Pathology - Surgery  Once     Comments:  BILATERAL TUBES      10/20/17 1259          Discharge Note    SUMMARY     Admit Date: 10/20/2017    Discharge Date and Time:  10/20/2017 1:19 PM    Hospital Course (synopsis of major diagnoses, care, treatment, and services provided during the course of the hospital stay): 36 yo female with undesired fertility underwent a bilateral salpingectimy. She tolerated the procedure well and was transferred to the PACU in stable condition. She was discharged home on POD#0 after meeting routine post op milestones, ambulating, voiding, tolerating PO. She will f/u in the clinic in 6 weeks for post op visit.  See discharge medications below. Precautions given: fever, pain, purulent discharge, N/V, bleeding etc.       Final Diagnosis: Post-Op Diagnosis Codes:     * Sterilization [Z30.2]    Disposition: Home or Self Care    Follow Up/Patient Instructions:     Medications:  Reconciled Home Medications:   Current Discharge Medication List      START taking these medications    Details   hydrocodone-acetaminophen 5-325mg (NORCO) 5-325 mg per tablet Take 1 tablet by mouth every 4 (four) hours as needed for Pain.  Qty: 14 tablet, Refills: 0      ibuprofen (ADVIL,MOTRIN) 800 MG tablet Take 1 tablet (800 mg  total) by mouth every 8 (eight) hours as needed for Pain.  Qty: 30 tablet, Refills: 2         CONTINUE these medications which have NOT CHANGED    Details   cyanocobalamin (VITAMIN B-12) 1,000 mcg/mL injection 1,000 mcg every 28 days. monday      hydrochlorothiazide (HYDRODIURIL) 25 MG tablet Take 25 mg by mouth once daily.      losartan (COZAAR) 100 MG tablet Take 100 mg by mouth once daily.      meclizine (ANTIVERT) 25 mg tablet Take 25 mg by mouth 3 (three) times daily as needed.             Discharge Procedure Orders  Diet general     Call MD for:  temperature >100.4     Call MD for:  persistent nausea and vomiting     Call MD for:  severe uncontrolled pain     Call MD for:  difficulty breathing, headache or visual disturbances     Call MD for:  redness, tenderness, or signs of infection (pain, swelling, redness, odor or green/yellow discharge around incision site)     Call MD for:  hives     Call MD for:  persistent dizziness or light-headedness     Call MD for:  extreme fatigue     No dressing needed       Follow-up Information     Ralf Foster Jr, MD In 4 weeks.    Specialties:  Obstetrics, Obstetrics and Gynecology  Why:  Post op visit  Contact information:  [address] [address]  [phone]                 Candi Olivera MD  PGY 4 OB/GYN  [phone]    SEEN AND EXAMINED BY ME  AGREE WITH ABOVE    Ralf Foster MD

## 2017-10-21 VITALS
BODY MASS INDEX: 29.19 KG/M2 | SYSTOLIC BLOOD PRESSURE: 98 MMHG | TEMPERATURE: 98 F | WEIGHT: 171 LBS | DIASTOLIC BLOOD PRESSURE: 64 MMHG | RESPIRATION RATE: 18 BRPM | HEART RATE: 49 BPM | OXYGEN SATURATION: 99 % | HEIGHT: 64 IN

## 2017-10-21 PROCEDURE — 25000003 PHARM REV CODE 250: Performed by: OBSTETRICS & GYNECOLOGY

## 2017-10-21 RX ADMIN — STANDARDIZED SENNA CONCENTRATE AND DOCUSATE SODIUM 1 TABLET: 8.6; 5 TABLET, FILM COATED ORAL at 09:10

## 2017-10-21 RX ADMIN — IBUPROFEN 600 MG: 600 TABLET, FILM COATED ORAL at 12:10

## 2017-10-21 RX ADMIN — SODIUM CHLORIDE 500 ML: 0.9 INJECTION, SOLUTION INTRAVENOUS at 04:10

## 2017-10-21 RX ADMIN — IBUPROFEN 600 MG: 600 TABLET, FILM COATED ORAL at 06:10

## 2017-10-21 NOTE — SUBJECTIVE & OBJECTIVE
Interval History:   No acute events overnight, tolerating PO. Has not ambulated 2/2 trent catheter. Desires discharge today, pain is well controlled. Denies nausea/emesis.    Scheduled Meds:   ibuprofen  600 mg Oral Q6H    senna-docusate 8.6-50 mg  1 tablet Oral BID     Continuous Infusions:   PRN Meds:diphenhydrAMINE, diphenhydrAMINE, hydrocodone-acetaminophen 10-325mg, hydrocodone-acetaminophen 5-325mg, ibuprofen, ondansetron, ondansetron, promethazine (PHENERGAN) IVPB, sodium chloride 0.9%    Review of patient's allergies indicates:   Allergen Reactions    Sulfa (sulfonamide antibiotics) Nausea And Vomiting    Lortab asa [hydrocodone-aspirin]      Liquid only       Objective:     Vital Signs (Most Recent):  Temp: 98.3 °F (36.8 °C) (10/21/17 0751)  Pulse: (!) 49 (10/21/17 0751)  Resp: 18 (10/21/17 0751)  BP: 98/64 (10/21/17 0914)  SpO2: 99 % (10/21/17 0751) Vital Signs (24h Range):  Temp:  [97.6 °F (36.4 °C)-98.9 °F (37.2 °C)] 98.3 °F (36.8 °C)  Pulse:  [45-72] 49  Resp:  [16-20] 18  SpO2:  [96 %-100 %] 99 %  BP: ()/(51-74) 98/64     Weight: 77.6 kg (170 lb 15.8 oz)  Body mass index is 29.35 kg/m².  No LMP recorded.    I&O (Last 24H):        Physical Exam:   Constitutional: She appears well-developed and well-nourished.       Cardiovascular: Normal rate, regular rhythm and normal heart sounds.     Pulmonary/Chest: Effort normal. No respiratory distress.        Abdominal: Soft. Bowel sounds are normal. She exhibits abdominal incision (  la port sites x3, CDI ). She exhibits no distension.

## 2017-10-21 NOTE — PLAN OF CARE
Called resident re pt urinary retention and needs to stay overnight - new orders noted.  Patient to go to room 352

## 2017-10-21 NOTE — PLAN OF CARE
Problem: Patient Care Overview  Goal: Plan of Care Review  Outcome: Outcome(s) achieved Date Met: 10/21/17  Patient discharged home pain managed well with oral medications. No signs or symptoms of infection noted. Patient voiding without difficult, also tolerated diet without complaints.

## 2017-10-21 NOTE — PROGRESS NOTES
[facility]  Obstetrics & Gynecology  Progress Note    Patient Name: Silvia Bustamante  MRN: [id number]  Admission Date: 10/20/2017  Primary Care Provider: Primary Doctor No  Principal Problem: Status post bilateral salpingectomy    Subjective:     HPI:  Patient underwent bilateral salpingectomy on 10/20 remained in hospital overnight 2/2 urinary retention.    Interval History:   No acute events overnight, tolerating PO. Has not ambulated 2/2 trent catheter. Desires discharge today, pain is well controlled. Denies nausea/emesis.    Scheduled Meds:   ibuprofen  600 mg Oral Q6H    senna-docusate 8.6-50 mg  1 tablet Oral BID     Continuous Infusions:   PRN Meds:diphenhydrAMINE, diphenhydrAMINE, hydrocodone-acetaminophen 10-325mg, hydrocodone-acetaminophen 5-325mg, ibuprofen, ondansetron, ondansetron, promethazine (PHENERGAN) IVPB, sodium chloride 0.9%    Review of patient's allergies indicates:   Allergen Reactions    Sulfa (sulfonamide antibiotics) Nausea And Vomiting    Lortab asa [hydrocodone-aspirin]      Liquid only       Objective:     Vital Signs (Most Recent):  Temp: 98.3 °F (36.8 °C) (10/21/17 0751)  Pulse: (!) 49 (10/21/17 0751)  Resp: 18 (10/21/17 0751)  BP: 98/64 (10/21/17 0914)  SpO2: 99 % (10/21/17 0751) Vital Signs (24h Range):  Temp:  [97.6 °F (36.4 °C)-98.9 °F (37.2 °C)] 98.3 °F (36.8 °C)  Pulse:  [45-72] 49  Resp:  [16-20] 18  SpO2:  [96 %-100 %] 99 %  BP: ()/(51-74) 98/64     Weight: 77.6 kg (170 lb 15.8 oz)  Body mass index is 29.35 kg/m².  No LMP recorded.    I&O (Last 24H):        Physical Exam:   Constitutional: She appears well-developed and well-nourished.       Cardiovascular: Normal rate, regular rhythm and normal heart sounds.     Pulmonary/Chest: Effort normal. No respiratory distress.        Abdominal: Soft. Bowel sounds are normal. She exhibits abdominal incision (  la port sites x3, CDI ). She exhibits no distension.                       Assessment/Plan:     *  Status post bilateral salpingectomy    - Pain is well controlled, meeting milestones  - Ok for discharge today        Urinary retention    - Patient voided 750cc after trent discharge, OK for DC home            B Alonzo Rhodes MD  Obstetrics & Gynecology  [facility]

## 2017-10-21 NOTE — PLAN OF CARE
OB Resident called - patient unable to void and states she lives 2 hours away and has had urinary retention with surgeries in the past.

## 2017-10-21 NOTE — HOSPITAL COURSE
Patient remained in hospital overnight after failure to void, patient notes frequent problems with urinary retention after anesthesia in the past and requested to remain the hospital overnight.  She was able to void the next day.

## 2017-10-22 NOTE — DISCHARGE SUMMARY
[facility]  Obstetrics & Gynecology  Discharge Summary    Patient Name: Silvia Bustamante  MRN: [id number]  Admission Date: 10/20/2017  Hospital Length of Stay: 0 days  Discharge Date and Time: 10/21/2017 11:57 AM  Attending Physician: Priti att. providers found   Discharging Provider: FAITH Rhodes MD  Primary Care Provider: Primary Doctor Priti    HPI:  Patient underwent bilateral salpingectomy on 10/20    Hospital Course:  Patient remained in hospital overnight after failure to void, patient notes frequent problems with urinary retention after anesthesia in the past and requested to remain the hospital overnight.  She was able to void the next day. And met all other surgical milestones.    Procedure(s) (LRB):  SALPINGO-LAPAROSCOPIC/ JUST TUBES NOT OVARIES (Bilateral)             Pending Diagnostic Studies:     None        Final Active Diagnoses:    Diagnosis Date Noted POA    PRINCIPAL PROBLEM:  Status post bilateral salpingectomy [Z90.79] 10/20/2017 Not Applicable    Admission for tubal ligation [Z30.2] 10/20/2017 Not Applicable    Urinary retention [R33.9] 10/20/2017 Yes      Problems Resolved During this Admission:    Diagnosis Date Noted Date Resolved POA        Discharged Condition: good    Disposition: Home or Self Care    Follow Up:  Follow-up Information     Ralf Foster Jr, MD In 4 weeks.    Specialties:  Obstetrics, Obstetrics and Gynecology  Why:  Post op visit  Contact information:  [address] [address] [phone]                 Patient Instructions:     Diet general     Call MD for:  temperature >100.4     Call MD for:  persistent nausea and vomiting     Call MD for:  severe uncontrolled pain     Call MD for:  difficulty breathing, headache or visual disturbances     Call MD for:  redness, tenderness, or signs of infection (pain, swelling, redness, odor or green/yellow discharge around incision site)     Call MD for:  hives     Call MD for:  persistent  dizziness or light-headedness     Call MD for:  extreme fatigue     No dressing needed       Medications:  Reconciled Home Medications:   Discharge Medication List as of 10/21/2017 11:13 AM      START taking these medications    Details   hydrocodone-acetaminophen 5-325mg (NORCO) 5-325 mg per tablet Take 1 tablet by mouth every 4 (four) hours as needed for Pain., Starting Fri 10/20/2017, Print      ibuprofen (ADVIL,MOTRIN) 800 MG tablet Take 1 tablet (800 mg total) by mouth every 8 (eight) hours as needed for Pain., Starting Fri 10/20/2017, Normal         CONTINUE these medications which have NOT CHANGED    Details   cyanocobalamin (VITAMIN B-12) 1,000 mcg/mL injection 1,000 mcg every 28 days. monday, Historical Med      hydrochlorothiazide (HYDRODIURIL) 25 MG tablet Take 25 mg by mouth once daily., Historical Med      losartan (COZAAR) 100 MG tablet Take 100 mg by mouth once daily., Historical Med      meclizine (ANTIVERT) 25 mg tablet Take 25 mg by mouth 3 (three) times daily as needed., Historical Med             B Alonzo Rhodes MD  Obstetrics & Gynecology  [facility]    Doing well, no questions,no problems, voiding, ready for discharge..  I have reviewed the resident's note, evaluated the patient and agree with the diagnosis and management plan

## 2017-10-23 ENCOUNTER — NURSE TRIAGE (OUTPATIENT)
Dept: ADMINISTRATIVE | Facility: CLINIC | Age: 37
End: 2017-10-23

## 2017-10-23 ENCOUNTER — TELEPHONE (OUTPATIENT)
Dept: OBSTETRICS AND GYNECOLOGY | Facility: CLINIC | Age: 37
End: 2017-10-23

## 2017-10-23 NOTE — TELEPHONE ENCOUNTER
Spoke with pt, starting having bright red bleeding, changing a pap about every 3 hours.  No cramping or pain.  Advised her this is normal and she may also be starting her cycle.  Advised her to monitor and if the bleeding becomes where she is changing a super pad every hour, she needs to come in or go to the ER.  Pt states understanding.

## 2017-10-23 NOTE — TELEPHONE ENCOUNTER
Reason for Disposition   Pale skin (pallor) of new onset or worsening    Protocols used: ST VAGINAL BLEEDING - [id number]    Pt had tubal done on Friday. Woke up this morning with an initial gush of bright red blood from her vagina. Since being up for an hour she has only bled through about 1/4 of one pad. Denies any new dizziness. She states that she is currently already being treated for anemia. Denies pain. Advised to that she needed to be seen within 4 hours.    Please contact patient to advise

## 2017-10-23 NOTE — TELEPHONE ENCOUNTER
----- Message from Radha Batista sent at 10/23/2017 10:21 AM CDT -----  Contact: pt  _ 1st Request  x_ 2nd Request  _ 3rd Request    Who: pt    Why: is stating she is experiencing bleeding after having surgery with Dr. Foster. Pt has concerns if she should go to the E.R. Or what next steps she is needing to take     What Number to Call Back: [phone]    When to Expect a call back: (Before the end of the day)  -- if call after 3:00 call back will be tomorrow.

## 2017-10-25 ENCOUNTER — TELEPHONE (OUTPATIENT)
Dept: OBSTETRICS AND GYNECOLOGY | Facility: CLINIC | Age: 37
End: 2017-10-25

## 2017-10-25 NOTE — TELEPHONE ENCOUNTER
Called pt to verify if she is still experiencing bleeding after her procedure.  Pt stated that she is still experiencing bright red blood today but that her bleeding is not as bad as Monday.  Pt denied experiencing any pain or saturating more than one pad in an hour.  Instructed pt to continue to monitor her bleeding and that she may experiencing bleeding for up to two weeks.  Instructed pt to report to the ED if she starts to feel SOB, dizziness or extreme fatigue, as well as saturating more than one pad in an hour. Instructing pt to call the office if she is still experiencing bleeding in two weeks.  Pt verbalized understanding.

## 2019-03-29 ENCOUNTER — OFFICE VISIT (OUTPATIENT)
Dept: OBSTETRICS AND GYNECOLOGY | Facility: CLINIC | Age: 39
End: 2019-03-29
Payer: COMMERCIAL

## 2019-03-29 VITALS
DIASTOLIC BLOOD PRESSURE: 78 MMHG | SYSTOLIC BLOOD PRESSURE: 120 MMHG | HEIGHT: 64 IN | BODY MASS INDEX: 29.74 KG/M2 | WEIGHT: 174.19 LBS

## 2019-03-29 DIAGNOSIS — Z01.419 ENCOUNTER FOR GYNECOLOGICAL EXAMINATION WITHOUT ABNORMAL FINDING: Primary | ICD-10-CM

## 2019-03-29 DIAGNOSIS — R68.82 DECREASED LIBIDO: ICD-10-CM

## 2019-03-29 PROBLEM — Z30.2 ADMISSION FOR TUBAL LIGATION: Status: RESOLVED | Noted: 2017-10-20 | Resolved: 2019-03-29

## 2019-03-29 PROBLEM — Z90.79 STATUS POST BILATERAL SALPINGECTOMY: Status: RESOLVED | Noted: 2017-10-20 | Resolved: 2019-03-29

## 2019-03-29 PROCEDURE — 3074F PR MOST RECENT SYSTOLIC BLOOD PRESSURE < 130 MM HG: ICD-10-PCS | Mod: CPTII,S$GLB,, | Performed by: OBSTETRICS & GYNECOLOGY

## 2019-03-29 PROCEDURE — 99999 PR PBB SHADOW E&M-EST. PATIENT-LVL III: CPT | Mod: PBBFAC,,, | Performed by: OBSTETRICS & GYNECOLOGY

## 2019-03-29 PROCEDURE — 99395 PREV VISIT EST AGE 18-39: CPT | Mod: S$GLB,,, | Performed by: OBSTETRICS & GYNECOLOGY

## 2019-03-29 PROCEDURE — 3078F PR MOST RECENT DIASTOLIC BLOOD PRESSURE < 80 MM HG: ICD-10-PCS | Mod: CPTII,S$GLB,, | Performed by: OBSTETRICS & GYNECOLOGY

## 2019-03-29 PROCEDURE — 99999 PR PBB SHADOW E&M-EST. PATIENT-LVL III: ICD-10-PCS | Mod: PBBFAC,,, | Performed by: OBSTETRICS & GYNECOLOGY

## 2019-03-29 PROCEDURE — 88175 CYTOPATH C/V AUTO FLUID REDO: CPT

## 2019-03-29 PROCEDURE — 3074F SYST BP LT 130 MM HG: CPT | Mod: CPTII,S$GLB,, | Performed by: OBSTETRICS & GYNECOLOGY

## 2019-03-29 PROCEDURE — 99395 PR PREVENTIVE VISIT,EST,18-39: ICD-10-PCS | Mod: S$GLB,,, | Performed by: OBSTETRICS & GYNECOLOGY

## 2019-03-29 PROCEDURE — 3078F DIAST BP <80 MM HG: CPT | Mod: CPTII,S$GLB,, | Performed by: OBSTETRICS & GYNECOLOGY

## 2019-03-29 NOTE — PROGRESS NOTES
PT HERE FOR ANNUAL.  FEELS SHE IS EMOTIONLESS AND DOESN'T WANT SEX.  INITIALLY AFTER TUBAL HAD AUB. NOW FINE.    ROS:  GENERAL: No fever, chills, fatigability or weight loss.  VULVAR: No pain, no lesions and no itching.  VAGINAL: No relaxation, no itching, no discharge, no abnormal bleeding and no lesions.  ABDOMEN: No abdominal pain. Denies nausea. Denies vomiting. No diarrhea. No constipation  BREAST: Denies pain. No lumps. No discharge.  URINARY: No incontinence, no nocturia, no frequency and no dysuria.  CARDIOVASCULAR: No chest pain. No shortness of breath. No leg cramps.  NEUROLOGICAL: No headaches. No vision changes.  The remainder of the review of systems was negative.    PE:  General Appearance: overweight And Well developed. Well nourished. In no acute distress.  Vulva: Lesions: No.  Urethral Meatus: Normal size. Normal location. No lesions. No prolapse.  Urethra: No masses. No tenderness. No prolapse. No scarring.  Bladder: No masses. No tenderness.  Vagina: Mucosa NI:yes discharge no, atrophy no, cystocele no or rectocele no.  Cervix: Lesion: no  Stenotic: no Cervical motion tenderness: no  Uterus: Uterus size: 6 weeks. Support good. Uterus size: Normal  Adnexa: Masses: No Tenderness: No CDS Nodularity: No  Abdomen: overweight No masses. No tenderness.  Breasts: No bilateral masses. No bilateral discharge. No bilateral tenderness. No bilateral fibrocystic changes.  Neck: No thyroid enlargement. No thyroid masses.  Skin: Rashes: No      PROCEDURES:    PLAN:     DIAGNOSIS:  1. Encounter for gynecological examination without abnormal finding    2. Decreased libido        MEDICATIONS & ORDERS:       Patient was counseled today on the new ACS guidelines for cervical cytology screening as well as the current recommendations for breast cancer screening. She was counseled to follow up with her PCP for other routine health maintenance. Counseling session lasted approximately 10 minutes, and all her questions were  answered.     20 MIN D/W PT ON CAUSES AND TX SEXUAL DYSFUNCTION.    FOLLOW-UP: With me in 12 month

## 2019-07-05 ENCOUNTER — TELEPHONE (OUTPATIENT)
Dept: OBSTETRICS AND GYNECOLOGY | Facility: CLINIC | Age: 39
End: 2019-07-05

## 2019-07-05 RX ORDER — VALACYCLOVIR HYDROCHLORIDE 500 MG/1
500 TABLET, FILM COATED ORAL 2 TIMES DAILY
Qty: 10 TABLET | Refills: 12 | Status: SHIPPED | OUTPATIENT
Start: 2019-07-05 | End: 2019-07-09

## 2019-07-05 NOTE — TELEPHONE ENCOUNTER
----- Message from Lourdes Harris sent at 7/5/2019  9:26 AM CDT -----  Contact: Pt  Pt is requesting a callback at [phone] says pt need a refill on medication that she was taking in the past

## 2019-07-05 NOTE — TELEPHONE ENCOUNTER
----- Message from Lourdes Harris sent at 7/5/2019 10:56 AM CDT -----  Contact: Pt  Pt says she just missed a call from office and asked if she can get a return call back     Pt can be reached at [phone]

## 2019-07-09 ENCOUNTER — TELEPHONE (OUTPATIENT)
Dept: OBSTETRICS AND GYNECOLOGY | Facility: CLINIC | Age: 39
End: 2019-07-09

## 2019-07-09 RX ORDER — VALACYCLOVIR HYDROCHLORIDE 500 MG/1
500 TABLET, FILM COATED ORAL DAILY
Qty: 30 TABLET | Refills: 12 | Status: SHIPPED | OUTPATIENT
Start: 2019-07-09 | End: 2020-08-03

## 2019-07-09 NOTE — TELEPHONE ENCOUNTER
----- Message from Marino Pope sent at 7/9/2019  8:16 AM CDT -----  Please call pt you call in the wrong rx [phone]

## 2019-07-09 NOTE — TELEPHONE ENCOUNTER
Spoke with pt. Pt states she would like a prescription for Valtrex for maintenance not for the 5 day course for an outbreak. She would like to take it daily to prevent any outbreaks. Please advise.

## 2021-08-03 ENCOUNTER — TELEPHONE (OUTPATIENT)
Dept: OBSTETRICS AND GYNECOLOGY | Facility: CLINIC | Age: 41
End: 2021-08-03

## 2021-08-03 RX ORDER — VALACYCLOVIR HYDROCHLORIDE 500 MG/1
500 TABLET, FILM COATED ORAL DAILY
Qty: 30 TABLET | Refills: 12 | Status: SHIPPED | OUTPATIENT
Start: 2021-08-03 | End: 2023-01-18

## 2021-09-09 NOTE — PLAN OF CARE
Patient aware:  Labs show her kidney function is a little bit decreased. Make sure she is drinking plenty of fluids, and that she is not taking anything over-the-counter for pain except Tylenol. I am putting in an order for her to have nonfasting labs drawn in about 2 weeks to recheck her kidney function. Nonfasting is fine, please schedule her for this ([facility]). Patient verbalizes understanding and is scheduled for lab work 9/24/2021. Problem: Patient Care Overview  Goal: Plan of Care Review  Outcome: Ongoing (interventions implemented as appropriate)  Pt free of trauma, falls, and injury. Pt's BP and HR was low during shift but asymptomatic and afebrile throughout shift. Pt free of skin breakdown. Pt dressing is clean, dry, and intact(3 lap sites). Pt pain has been moderately controlled by PO pain meds and tolerated well. Pt has been eating and voiding adequately throughout shift. Purposeful rounding done. Pt has call light in reach, bed alarm on, bed brakes on, side rails up x2, bed in low position, TEDs/SCDs on, IS at bedside, and nonskid socks on. Pt lying in bed in no distress.  at bedside. Will continue to monitor.

## 2022-01-11 ENCOUNTER — TELEPHONE (OUTPATIENT)
Dept: OBSTETRICS AND GYNECOLOGY | Facility: CLINIC | Age: 42
End: 2022-01-11
Payer: COMMERCIAL

## 2022-01-11 NOTE — TELEPHONE ENCOUNTER
----- Message from Danna Small sent at 1/11/2022 10:05 AM CST -----  Regarding: Advice  Contact: [phone]  Pt is calling to speak with someone about an authorization sent from [employer]. Please contact pt

## 2022-01-14 ENCOUNTER — TELEPHONE (OUTPATIENT)
Dept: OBSTETRICS AND GYNECOLOGY | Facility: CLINIC | Age: 42
End: 2022-01-14
Payer: COMMERCIAL

## 2022-01-14 NOTE — TELEPHONE ENCOUNTER
----- Message from Adrianne Harrison sent at 1/14/2022  2:05 PM CST -----  Who Called:        Refill or New Rx: refill         RX Name and Strength:   valACYclovir (VALTREX) 500 MG tablet        How is the patient currently taking it? (ex. 1XDay)        Is this a 30 day or 90 day RX        Preferred Pharmacy with phone number:   fax# [phone] e scribe # [phone]         Local or Mail Order:        Ordering Provider:        Would the patient rather a call back or a response via [facility]?         Best Call Back Number:  [place] / [facility]  / [phone]          Additional Information:

## 2022-01-21 ENCOUNTER — TELEPHONE (OUTPATIENT)
Dept: OBSTETRICS AND GYNECOLOGY | Facility: CLINIC | Age: 42
End: 2022-01-21
Payer: COMMERCIAL

## 2022-01-21 NOTE — TELEPHONE ENCOUNTER
----- Message from Silvia Marx sent at 1/20/2022  2:03 PM CST -----  Regarding: nurse  Contact: Pt   Please Contact Pt     Regarding: Medication    Contact: [phone]  Pt is calling to speak with someone about an authorization sent from [employer]. Please contact pt    Thank You

## 2022-05-18 NOTE — PLAN OF CARE
Problem: Patient Care Overview  Goal: Plan of Care Review  Outcome: Ongoing (interventions implemented as appropriate)  Pt tolerating PO well, no acute distress, dizziness resolved, ambulating and voiding without difficulty, bleeding light, fundus firm, pain well controlled on prescribed meds, bonding well with infant-- breast feeding.  Pt safety maintained. Will continue to monitor.        2 78 y/o female with a PMH of thrombocytosis, interstitial lung disease followed by pulm Dr. Pearce, depression, hypothyroidism, spinal stenosis followed by Dr. العلي, HTN followed by cardio nori Irwinn hx of bleeding ulcer s presents to the ED for evaluation of confusion x 2 days. pt niece reports pt has been having severe lower back pain and is being treated  for it, however she has not been  getting up and walking, or eating x 2  days. as per pt niece, she was staying at her house for a few days, but then pt brother brought her back home to her upstairs apartment for him because she was yelling at family. pt lives above her brother who cannot completely take care of her due his own medical issues. pt is complaining of abdominal pain. as per pt  family denies fever, chills coughing, n/v/d/c, chest pain, sob, headache, dizziness, or urinary symptoms. at this time pt is not participating in conversation and is sleepy.

## 2022-09-01 ENCOUNTER — TELEPHONE (OUTPATIENT)
Dept: OBSTETRICS AND GYNECOLOGY | Facility: CLINIC | Age: 42
End: 2022-09-01
Payer: COMMERCIAL

## 2022-09-01 DIAGNOSIS — Z12.39 OTHER SCREENING BREAST EXAMINATION: Primary | ICD-10-CM

## 2022-10-12 ENCOUNTER — TELEPHONE (OUTPATIENT)
Dept: OBSTETRICS AND GYNECOLOGY | Facility: CLINIC | Age: 42
End: 2022-10-12
Payer: COMMERCIAL

## 2022-10-12 NOTE — TELEPHONE ENCOUNTER
----- Message from Radha Deng sent at 10/12/2022  4:46 PM CDT -----  Regarding: rt a missed dejan   Type:  Patient Returning Call    Who Called: JO ANN PANIAGUA [[id number]]    Who Left Message for Patient:unknown    Does the patient know what this is regarding? yes    Best Call Back Number:[phone]    Additional Information: pt states she needs to discuss a surgery with

## 2022-10-13 DIAGNOSIS — N94.6 DYSMENORRHEA: ICD-10-CM

## 2022-10-13 DIAGNOSIS — N93.8 DUB (DYSFUNCTIONAL UTERINE BLEEDING): Primary | ICD-10-CM

## 2022-10-13 PROBLEM — R33.9 URINARY RETENTION: Status: RESOLVED | Noted: 2017-10-20 | Resolved: 2022-10-13

## 2022-10-13 PROBLEM — E53.8 B12 DEFICIENCY: Status: RESOLVED | Noted: 2017-03-31 | Resolved: 2022-10-13

## 2022-10-13 RX ORDER — MISOPROSTOL 200 UG/1
TABLET ORAL
Qty: 3 TABLET | Refills: 0 | Status: SHIPPED | OUTPATIENT
Start: 2022-10-13 | End: 2022-12-01

## 2022-10-17 ENCOUNTER — TELEPHONE (OUTPATIENT)
Dept: OBSTETRICS AND GYNECOLOGY | Facility: CLINIC | Age: 42
End: 2022-10-17
Payer: COMMERCIAL

## 2022-10-17 ENCOUNTER — PATIENT MESSAGE (OUTPATIENT)
Dept: OBSTETRICS AND GYNECOLOGY | Facility: CLINIC | Age: 42
End: 2022-10-17
Payer: COMMERCIAL

## 2022-10-17 DIAGNOSIS — N93.8 DUB (DYSFUNCTIONAL UTERINE BLEEDING): Primary | ICD-10-CM

## 2022-10-17 NOTE — TELEPHONE ENCOUNTER
----- Message from Colleen Bella sent at 10/17/2022  1:13 PM CDT -----  Regarding: CAll BAck  Name of Who is Calling: Mary Carmen with [facility]              What is the request in detail: Mary Carmen requesting a call back about medication miSOPROStoL (CYTOTEC) 200 MCG Tab states direction is un clear. Please assist              Can the clinic reply by [facility]: No              What Number to Call Back if not in [facility]: Mary Carmen ph. [phone] Fax [phone]

## 2022-10-21 ENCOUNTER — OFFICE VISIT (OUTPATIENT)
Dept: OBSTETRICS AND GYNECOLOGY | Facility: CLINIC | Age: 42
End: 2022-10-21
Payer: COMMERCIAL

## 2022-10-21 ENCOUNTER — HOSPITAL ENCOUNTER (OUTPATIENT)
Dept: RADIOLOGY | Facility: OTHER | Age: 42
Discharge: HOME | End: 2022-10-21
Attending: OBSTETRICS & GYNECOLOGY
Payer: COMMERCIAL

## 2022-10-21 VITALS
BODY MASS INDEX: 29.96 KG/M2 | DIASTOLIC BLOOD PRESSURE: 88 MMHG | HEIGHT: 64 IN | WEIGHT: 175.5 LBS | SYSTOLIC BLOOD PRESSURE: 137 MMHG | HEART RATE: 63 BPM

## 2022-10-21 DIAGNOSIS — N93.8 DUB (DYSFUNCTIONAL UTERINE BLEEDING): ICD-10-CM

## 2022-10-21 DIAGNOSIS — N94.6 DYSMENORRHEA: ICD-10-CM

## 2022-10-21 DIAGNOSIS — Z01.419 ROUTINE GYNECOLOGICAL EXAMINATION: Primary | ICD-10-CM

## 2022-10-21 PROCEDURE — 88175 CYTOPATH C/V AUTO FLUID REDO: CPT | Performed by: OBSTETRICS & GYNECOLOGY

## 2022-10-21 PROCEDURE — 76830 US PELVIS COMP WITH TRANSVAG NON-OB (XPD): ICD-10-PCS | Mod: 26,,, | Performed by: RADIOLOGY

## 2022-10-21 PROCEDURE — 76830 TRANSVAGINAL US NON-OB: CPT | Mod: TC

## 2022-10-21 PROCEDURE — 1159F PR MEDICATION LIST DOCUMENTED IN MEDICAL RECORD: ICD-10-PCS | Mod: CPTII,S$GLB,, | Performed by: OBSTETRICS & GYNECOLOGY

## 2022-10-21 PROCEDURE — 99386 PR PREVENTIVE VISIT,NEW,40-64: ICD-10-PCS | Mod: 25,S$GLB,, | Performed by: OBSTETRICS & GYNECOLOGY

## 2022-10-21 PROCEDURE — 3079F PR MOST RECENT DIASTOLIC BLOOD PRESSURE 80-89 MM HG: ICD-10-PCS | Mod: CPTII,S$GLB,, | Performed by: OBSTETRICS & GYNECOLOGY

## 2022-10-21 PROCEDURE — 1159F MED LIST DOCD IN RCRD: CPT | Mod: CPTII,S$GLB,, | Performed by: OBSTETRICS & GYNECOLOGY

## 2022-10-21 PROCEDURE — 58100 BIOPSY OF UTERUS LINING: CPT | Mod: S$GLB,,, | Performed by: OBSTETRICS & GYNECOLOGY

## 2022-10-21 PROCEDURE — 99999 PR PBB SHADOW E&M-EST. PATIENT-LVL III: ICD-10-PCS | Mod: PBBFAC,,, | Performed by: OBSTETRICS & GYNECOLOGY

## 2022-10-21 PROCEDURE — 99386 PREV VISIT NEW AGE 40-64: CPT | Mod: 25,S$GLB,, | Performed by: OBSTETRICS & GYNECOLOGY

## 2022-10-21 PROCEDURE — 76830 TRANSVAGINAL US NON-OB: CPT | Mod: 26,,, | Performed by: RADIOLOGY

## 2022-10-21 PROCEDURE — 58100 ENDOMETRIAL BIOPSY: ICD-10-PCS | Mod: S$GLB,,, | Performed by: OBSTETRICS & GYNECOLOGY

## 2022-10-21 PROCEDURE — 88305 TISSUE EXAM BY PATHOLOGIST: CPT | Performed by: PATHOLOGY

## 2022-10-21 PROCEDURE — 76856 US PELVIS COMP WITH TRANSVAG NON-OB (XPD): ICD-10-PCS | Mod: 26,,, | Performed by: RADIOLOGY

## 2022-10-21 PROCEDURE — 3075F PR MOST RECENT SYSTOLIC BLOOD PRESS GE 130-139MM HG: ICD-10-PCS | Mod: CPTII,S$GLB,, | Performed by: OBSTETRICS & GYNECOLOGY

## 2022-10-21 PROCEDURE — 3075F SYST BP GE 130 - 139MM HG: CPT | Mod: CPTII,S$GLB,, | Performed by: OBSTETRICS & GYNECOLOGY

## 2022-10-21 PROCEDURE — 99999 PR PBB SHADOW E&M-EST. PATIENT-LVL III: CPT | Mod: PBBFAC,,, | Performed by: OBSTETRICS & GYNECOLOGY

## 2022-10-21 PROCEDURE — 3079F DIAST BP 80-89 MM HG: CPT | Mod: CPTII,S$GLB,, | Performed by: OBSTETRICS & GYNECOLOGY

## 2022-10-21 PROCEDURE — 88305 TISSUE EXAM BY PATHOLOGIST: CPT | Mod: 26,,, | Performed by: PATHOLOGY

## 2022-10-21 PROCEDURE — 88305 TISSUE EXAM BY PATHOLOGIST: ICD-10-PCS | Mod: 26,,, | Performed by: PATHOLOGY

## 2022-10-21 PROCEDURE — 76856 US EXAM PELVIC COMPLETE: CPT | Mod: 26,,, | Performed by: RADIOLOGY

## 2022-10-21 NOTE — PROCEDURES
Endometrial biopsy    Date/Time: 10/21/2022 10:15 AM  Performed by: Ralf Foster Jr., MD  Authorized by: Ralf Foster Jr., MD     Consent:     Consent obtained:  Verbal    Consent given by:  Patient    Patient questions answered: yes      Patient agrees, verbalizes understanding, and wants to proceed: yes      Educational handouts given: no      Instructions and paperwork completed: no    Indication:     Indications: Menorrhagia    Pre-procedure:     Pre-procedure timeout performed: yes    Procedure:     Procedure: endometrial biopsy with Pipelle      Uterus sounded: yes      Uterus sound depth (cm):  8    Specimen collected: specimen collected and sent to pathology      Patient tolerated procedure well with no complications: yes

## 2022-10-21 NOTE — PROGRESS NOTES
10/21/2022 U/S  Uterus:  Size: 8.5 x 4.3 x 4.2 cm  The parenchyma is homogeneous.  Endometrium is normal caliber measuring 0.4 cm.  Right ovary:  Not visualized  Left ovary:  Size: 2.7 x 2.4 x 2 cm  Free Fluid:  None.  Impression:  Right ovary not visualized.  Remainder of the exam is normal.    Final Pathologic Diagnosis ENDOMETRIUM, BIOPSY:   Fragments of secretory endometrium with stromal edema.   Negative for atypia, hyperplasia or malignancy.        PT HERE FOR ANNUAL.  MENSES HAVE CHANGED SINCE BTL.  Patient is bleeding. Cycle q IRR  days.  Menses for 2-7  days. NO Spotting b/t. NO Postcoital bleeding. +++ Heavy. OCC Double protection. NO Clots.  NO Accidents. YES Affect ADL's. NO S/Sx of anemia. SEVERE Dysmenorrhea RESISTANT TO NSAIDS    ROS:  GENERAL: No fever, chills, fatigability or weight loss.  VULVAR: No pain, no lesions and no itching.  VAGINAL: No relaxation, no itching, no discharge, no abnormal bleeding and no lesions.  ABDOMEN: No abdominal pain. Denies nausea. Denies vomiting. No diarrhea. No constipation  BREAST: Denies pain. No lumps. No discharge.  URINARY: No incontinence, no nocturia, no frequency and no dysuria.  CARDIOVASCULAR: No chest pain. No shortness of breath. No leg cramps.  NEUROLOGICAL: No headaches. No vision changes.  The remainder of the review of systems was negative.    PE:  General Appearance: overweight And Well developed. Well nourished. In no acute distress.  Vulva: Lesions: No.  Urethral Meatus: Normal size. Normal location. No lesions. No prolapse.  Urethra: No masses. No tenderness. No prolapse. No scarring.  Bladder: No masses. No tenderness.  Vagina: Mucosa NI:yes discharge no, atrophy no, cystocele no or rectocele no.  Cervix: Lesion: no  Stenotic: no Cervical motion tenderness: no  Uterus: Uterus size: 7 weeks. Support good. Uterus size: Normal  Adnexa: Masses: No Tenderness: No CDS Nodularity: No  Abdomen: overweight No masses. No tenderness.  Breasts: No bilateral  masses. No bilateral discharge. No bilateral tenderness. No bilateral fibrocystic changes.  Neck: No thyroid enlargement. No thyroid masses.  Skin: Rashes: No      PROCEDURES:    PLAN:     DIAGNOSIS:  1. Routine gynecological examination    2. Dysmenorrhea    3. DUB (dysfunctional uterine bleeding)        MEDICATIONS & ORDERS:  Orders Placed This Encounter    Endometrial biopsy    Liquid-Based Pap Smear, Screening    Specimen to Pathology Gynecology and Obstetrics    Case Request Operating Room: ROBOTIC HYSTERECTOMY       Patient was counseled today on the new ACS guidelines for cervical cytology screening as well as the current recommendations for breast cancer screening. She was counseled to follow up with her PCP for other routine health maintenance. Counseling session lasted approximately 10 minutes, and all her questions were answered.     20 MIN D/W PT:  EXP MANAGEMENT / MEDICATIONS / IUD / D&C HSCOPE +/- HTA / [place] / MYOMECTOMY / HYSTERECTOMY GIVEN     FOLLOW-UP: With me AFTER LABS    Ralf Foster Jr, MD, FACOG

## 2022-10-27 ENCOUNTER — PATIENT MESSAGE (OUTPATIENT)
Dept: OBSTETRICS AND GYNECOLOGY | Facility: CLINIC | Age: 42
End: 2022-10-27
Payer: COMMERCIAL

## 2022-10-28 LAB
FINAL PATHOLOGIC DIAGNOSIS: NORMAL
FINAL PATHOLOGIC DIAGNOSIS: NORMAL
GROSS: NORMAL
Lab: NORMAL
Lab: NORMAL

## 2022-11-08 ENCOUNTER — PATIENT MESSAGE (OUTPATIENT)
Dept: OBSTETRICS AND GYNECOLOGY | Facility: CLINIC | Age: 42
End: 2022-11-08
Payer: COMMERCIAL

## 2022-11-08 DIAGNOSIS — N93.8 DUB (DYSFUNCTIONAL UTERINE BLEEDING): ICD-10-CM

## 2022-11-08 DIAGNOSIS — N94.6 DYSMENORRHEA: Primary | ICD-10-CM

## 2022-12-01 ENCOUNTER — ANESTHESIA EVENT (OUTPATIENT)
Dept: SURGERY | Facility: OTHER | Age: 42
End: 2022-12-01
Payer: COMMERCIAL

## 2022-12-01 NOTE — PRE ADMISSION SCREENING
Dr. Fontana notified that pt refused preadmit appt for surgery on 12/6/2022 as pt lives in [place]. Orders placed.

## 2022-12-01 NOTE — PRE ADMISSION SCREENING
Pre admit phone call completed.    Instructions given to patient about NPO status as follows:     The evening before surgery do not eat anything after 9 p.m. ( this includes hard candy, chewing gum and mints).  You may only have GATORADE, POWERADE AND WATER from 9 p.m. until you leave your home. DO NOT  DRINK ANY LIQUIDS ON THE WAY TO THE HOSPITAL.      Patient was also instructed on the below information:    Park in the Parking lot behind the hospital or in the [employer] [facility] across the street from the parking lot.  Parking is complimentary.  If you will be discharged the same day as your procedure, please arrange for a responsible adult to drive you home or  to accompany you if traveling by taxi.  YOU WILL NOT BE PERMITTED TO DRIVE OR TO LEAVE THE HOSPITAL ALONE AFTER SURGERY.  It is strongly recommended that you arrange for someone to remain with you for the first 24 hrs following your surgery.    Patient verbalized understanding of above instructions.

## 2022-12-05 ENCOUNTER — TELEPHONE (OUTPATIENT)
Dept: OBSTETRICS AND GYNECOLOGY | Facility: CLINIC | Age: 42
End: 2022-12-05
Payer: COMMERCIAL

## 2022-12-05 NOTE — TELEPHONE ENCOUNTER
----- Message from Radhika Harrison sent at 12/5/2022  2:12 PM CST -----  Type:  Patient Returning Call    Who Called:pt  Who Left Message for Patient:pt  Does the patient know what this is regarding?:surgery arrival time  Would the patient rather a call back or a response via [facility]? Call  Best Call Back Number:[phone]  Additional Information:

## 2022-12-06 ENCOUNTER — ANESTHESIA (OUTPATIENT)
Dept: SURGERY | Facility: OTHER | Age: 42
End: 2022-12-06
Payer: COMMERCIAL

## 2022-12-06 ENCOUNTER — HOSPITAL ENCOUNTER (OUTPATIENT)
Facility: OTHER | Age: 42
Discharge: HOME | End: 2022-12-06
Attending: OBSTETRICS & GYNECOLOGY | Admitting: OBSTETRICS & GYNECOLOGY
Payer: COMMERCIAL

## 2022-12-06 DIAGNOSIS — Z90.710 STATUS POST TOTAL HYSTERECTOMY: Primary | ICD-10-CM

## 2022-12-06 DIAGNOSIS — Z01.818 PREOP TESTING: ICD-10-CM

## 2022-12-06 PROBLEM — N94.6 DYSMENORRHEA: Status: ACTIVE | Noted: 2022-12-06

## 2022-12-06 PROBLEM — N93.8 DUB (DYSFUNCTIONAL UTERINE BLEEDING): Status: RESOLVED | Noted: 2022-12-06 | Resolved: 2022-12-06

## 2022-12-06 PROBLEM — N93.8 DUB (DYSFUNCTIONAL UTERINE BLEEDING): Status: ACTIVE | Noted: 2022-12-06

## 2022-12-06 PROBLEM — N94.6 DYSMENORRHEA: Status: RESOLVED | Noted: 2022-12-06 | Resolved: 2022-12-06

## 2022-12-06 LAB
ABO + RH BLD: NORMAL
ANION GAP SERPL CALC-SCNC: 5 MMOL/L (ref 8–16)
B-HCG UR QL: NEGATIVE
BASOPHILS # BLD AUTO: 0.05 K/UL (ref 0–0.2)
BASOPHILS NFR BLD: 1.1 % (ref 0–1.9)
BLD GP AB SCN CELLS X3 SERPL QL: NORMAL
BUN SERPL-MCNC: 7 MG/DL (ref 6–20)
CALCIUM SERPL-MCNC: 8.9 MG/DL (ref 8.7–10.5)
CHLORIDE SERPL-SCNC: 106 MMOL/L (ref 95–110)
CO2 SERPL-SCNC: 27 MMOL/L (ref 23–29)
CREAT SERPL-MCNC: 0.7 MG/DL (ref 0.5–1.4)
CTP QC/QA: YES
DIFFERENTIAL METHOD: ABNORMAL
EOSINOPHIL # BLD AUTO: 0.1 K/UL (ref 0–0.5)
EOSINOPHIL NFR BLD: 2.5 % (ref 0–8)
ERYTHROCYTE [DISTWIDTH] IN BLOOD BY AUTOMATED COUNT: 14.6 % (ref 11.5–14.5)
EST. GFR  (NO RACE VARIABLE): >60 ML/MIN/1.73 M^2
GLUCOSE SERPL-MCNC: 95 MG/DL (ref 70–110)
HCT VFR BLD AUTO: 31.9 % (ref 37–48.5)
HGB BLD-MCNC: 10.1 G/DL (ref 12–16)
IMM GRANULOCYTES # BLD AUTO: 0.01 K/UL (ref 0–0.04)
IMM GRANULOCYTES NFR BLD AUTO: 0.2 % (ref 0–0.5)
LYMPHOCYTES # BLD AUTO: 1.6 K/UL (ref 1–4.8)
LYMPHOCYTES NFR BLD: 36.5 % (ref 18–48)
MCH RBC QN AUTO: 26.8 PG (ref 27–31)
MCHC RBC AUTO-ENTMCNC: 31.7 G/DL (ref 32–36)
MCV RBC AUTO: 85 FL (ref 82–98)
MONOCYTES # BLD AUTO: 0.5 K/UL (ref 0.3–1)
MONOCYTES NFR BLD: 10.6 % (ref 4–15)
NEUTROPHILS # BLD AUTO: 2.1 K/UL (ref 1.8–7.7)
NEUTROPHILS NFR BLD: 49.1 % (ref 38–73)
NRBC BLD-RTO: 0 /100 WBC
PLATELET # BLD AUTO: 277 K/UL (ref 150–450)
PLATELET BLD QL SMEAR: ABNORMAL
PMV BLD AUTO: 10.4 FL (ref 9.2–12.9)
POCT GLUCOSE: 82 MG/DL (ref 70–110)
POTASSIUM SERPL-SCNC: 4.1 MMOL/L (ref 3.5–5.1)
RBC # BLD AUTO: 3.77 M/UL (ref 4–5.4)
SODIUM SERPL-SCNC: 138 MMOL/L (ref 136–145)
WBC # BLD AUTO: 4.36 K/UL (ref 3.9–12.7)

## 2022-12-06 PROCEDURE — 25000003 PHARM REV CODE 250: Performed by: STUDENT IN AN ORGANIZED HEALTH CARE EDUCATION/TRAINING PROGRAM

## 2022-12-06 PROCEDURE — 58571 PR LAPAROSCOPY W TOT HYSTERECTUTERUS <=250 GRAM  W TUBE/OVARY: ICD-10-PCS | Mod: ,,, | Performed by: OBSTETRICS & GYNECOLOGY

## 2022-12-06 PROCEDURE — 58571 TLH W/T/O 250 G OR LESS: CPT | Mod: AS,,, | Performed by: NURSE PRACTITIONER

## 2022-12-06 PROCEDURE — 63600175 PHARM REV CODE 636 W HCPCS: Performed by: GENERAL PRACTICE

## 2022-12-06 PROCEDURE — 80048 BASIC METABOLIC PNL TOTAL CA: CPT | Performed by: OBSTETRICS & GYNECOLOGY

## 2022-12-06 PROCEDURE — 63600175 PHARM REV CODE 636 W HCPCS: Performed by: ANESTHESIOLOGY

## 2022-12-06 PROCEDURE — 85025 COMPLETE CBC W/AUTO DIFF WBC: CPT | Performed by: OBSTETRICS & GYNECOLOGY

## 2022-12-06 PROCEDURE — 63600175 PHARM REV CODE 636 W HCPCS: Performed by: STUDENT IN AN ORGANIZED HEALTH CARE EDUCATION/TRAINING PROGRAM

## 2022-12-06 PROCEDURE — 71000033 HC RECOVERY, INTIAL HOUR: Performed by: OBSTETRICS & GYNECOLOGY

## 2022-12-06 PROCEDURE — 88307 PR  SURG PATH,LEVEL V: ICD-10-PCS | Mod: 26,,, | Performed by: STUDENT IN AN ORGANIZED HEALTH CARE EDUCATION/TRAINING PROGRAM

## 2022-12-06 PROCEDURE — 27201423 OPTIME MED/SURG SUP & DEVICES STERILE SUPPLY: Performed by: OBSTETRICS & GYNECOLOGY

## 2022-12-06 PROCEDURE — 63600175 PHARM REV CODE 636 W HCPCS: Mod: JG | Performed by: NURSE ANESTHETIST, CERTIFIED REGISTERED

## 2022-12-06 PROCEDURE — 25000003 PHARM REV CODE 250: Performed by: ANESTHESIOLOGY

## 2022-12-06 PROCEDURE — 58571 PR LAPAROSCOPY W TOT HYSTERECTUTERUS <=250 GRAM  W TUBE/OVARY: ICD-10-PCS | Mod: AS,,, | Performed by: NURSE PRACTITIONER

## 2022-12-06 PROCEDURE — 36000713 HC OR TIME LEV V EA ADD 15 MIN: Performed by: OBSTETRICS & GYNECOLOGY

## 2022-12-06 PROCEDURE — 86850 RBC ANTIBODY SCREEN: CPT | Performed by: OBSTETRICS & GYNECOLOGY

## 2022-12-06 PROCEDURE — P9045 ALBUMIN (HUMAN), 5%, 250 ML: HCPCS | Mod: JG | Performed by: STUDENT IN AN ORGANIZED HEALTH CARE EDUCATION/TRAINING PROGRAM

## 2022-12-06 PROCEDURE — 37000009 HC ANESTHESIA EA ADD 15 MINS: Performed by: OBSTETRICS & GYNECOLOGY

## 2022-12-06 PROCEDURE — 37000008 HC ANESTHESIA 1ST 15 MINUTES: Performed by: OBSTETRICS & GYNECOLOGY

## 2022-12-06 PROCEDURE — 58571 TLH W/T/O 250 G OR LESS: CPT | Mod: ,,, | Performed by: OBSTETRICS & GYNECOLOGY

## 2022-12-06 PROCEDURE — 25000003 PHARM REV CODE 250: Performed by: GENERAL PRACTICE

## 2022-12-06 PROCEDURE — 88307 TISSUE EXAM BY PATHOLOGIST: CPT | Performed by: STUDENT IN AN ORGANIZED HEALTH CARE EDUCATION/TRAINING PROGRAM

## 2022-12-06 PROCEDURE — 81025 URINE PREGNANCY TEST: CPT | Performed by: GENERAL PRACTICE

## 2022-12-06 PROCEDURE — 88307 TISSUE EXAM BY PATHOLOGIST: CPT | Mod: 26,,, | Performed by: STUDENT IN AN ORGANIZED HEALTH CARE EDUCATION/TRAINING PROGRAM

## 2022-12-06 PROCEDURE — 71000039 HC RECOVERY, EACH ADD'L HOUR: Performed by: OBSTETRICS & GYNECOLOGY

## 2022-12-06 PROCEDURE — 25000003 PHARM REV CODE 250: Performed by: OBSTETRICS & GYNECOLOGY

## 2022-12-06 PROCEDURE — 71000016 HC POSTOP RECOV ADDL HR: Performed by: OBSTETRICS & GYNECOLOGY

## 2022-12-06 PROCEDURE — 36415 COLL VENOUS BLD VENIPUNCTURE: CPT | Performed by: OBSTETRICS & GYNECOLOGY

## 2022-12-06 PROCEDURE — 36000712 HC OR TIME LEV V 1ST 15 MIN: Performed by: OBSTETRICS & GYNECOLOGY

## 2022-12-06 PROCEDURE — 25000003 PHARM REV CODE 250: Performed by: NURSE ANESTHETIST, CERTIFIED REGISTERED

## 2022-12-06 PROCEDURE — 71000015 HC POSTOP RECOV 1ST HR: Performed by: OBSTETRICS & GYNECOLOGY

## 2022-12-06 RX ORDER — PROPOFOL 10 MG/ML
VIAL (ML) INTRAVENOUS
Status: DISCONTINUED | OUTPATIENT
Start: 2022-12-06 | End: 2022-12-06

## 2022-12-06 RX ORDER — SCOLOPAMINE TRANSDERMAL SYSTEM 1 MG/1
1 PATCH, EXTENDED RELEASE TRANSDERMAL ONCE
Status: COMPLETED | OUTPATIENT
Start: 2022-12-06 | End: 2022-12-06

## 2022-12-06 RX ORDER — LIDOCAINE HYDROCHLORIDE 10 MG/ML
0.5 INJECTION, SOLUTION EPIDURAL; INFILTRATION; INTRACAUDAL; PERINEURAL ONCE
Status: DISCONTINUED | OUTPATIENT
Start: 2022-12-06 | End: 2023-01-25

## 2022-12-06 RX ORDER — FLUCONAZOLE 150 MG/1
150 TABLET ORAL DAILY
Qty: 1 TABLET | Refills: 0 | Status: SHIPPED | OUTPATIENT
Start: 2022-12-06 | End: 2022-12-07

## 2022-12-06 RX ORDER — FENTANYL CITRATE 50 UG/ML
INJECTION, SOLUTION INTRAMUSCULAR; INTRAVENOUS
Status: DISCONTINUED | OUTPATIENT
Start: 2022-12-06 | End: 2022-12-06

## 2022-12-06 RX ORDER — OXYCODONE HYDROCHLORIDE 5 MG/1
5 TABLET ORAL EVERY 4 HOURS PRN
Status: CANCELLED | OUTPATIENT
Start: 2022-12-06

## 2022-12-06 RX ORDER — METHYLENE BLUE 5 MG/ML
INJECTION INTRAVENOUS
Status: DISCONTINUED | OUTPATIENT
Start: 2022-12-06 | End: 2022-12-06

## 2022-12-06 RX ORDER — ROCURONIUM BROMIDE 10 MG/ML
INJECTION, SOLUTION INTRAVENOUS
Status: DISCONTINUED | OUTPATIENT
Start: 2022-12-06 | End: 2022-12-06

## 2022-12-06 RX ORDER — MEPERIDINE HYDROCHLORIDE 25 MG/ML
12.5 INJECTION INTRAMUSCULAR; INTRAVENOUS; SUBCUTANEOUS ONCE AS NEEDED
Status: DISCONTINUED | OUTPATIENT
Start: 2022-12-06 | End: 2022-12-06 | Stop reason: HOSPADM

## 2022-12-06 RX ORDER — SODIUM CHLORIDE 0.9 G/100ML
IRRIGANT IRRIGATION
Status: DISCONTINUED | OUTPATIENT
Start: 2022-12-06 | End: 2022-12-06 | Stop reason: HOSPADM

## 2022-12-06 RX ORDER — SODIUM CHLORIDE 0.9 % (FLUSH) 0.9 %
10 SYRINGE (ML) INJECTION
Status: DISCONTINUED | OUTPATIENT
Start: 2022-12-06 | End: 2022-12-06 | Stop reason: HOSPADM

## 2022-12-06 RX ORDER — FAMOTIDINE 20 MG/1
20 TABLET, FILM COATED ORAL
Status: COMPLETED | OUTPATIENT
Start: 2022-12-06 | End: 2022-12-06

## 2022-12-06 RX ORDER — KETAMINE HCL IN 0.9 % NACL 50 MG/5 ML
SYRINGE (ML) INTRAVENOUS
Status: DISCONTINUED | OUTPATIENT
Start: 2022-12-06 | End: 2022-12-06

## 2022-12-06 RX ORDER — ACETAMINOPHEN 500 MG
1000 TABLET ORAL EVERY 6 HOURS PRN
Status: CANCELLED | OUTPATIENT
Start: 2022-12-06

## 2022-12-06 RX ORDER — SODIUM CHLORIDE, SODIUM LACTATE, POTASSIUM CHLORIDE, CALCIUM CHLORIDE 600; 310; 30; 20 MG/100ML; MG/100ML; MG/100ML; MG/100ML
INJECTION, SOLUTION INTRAVENOUS CONTINUOUS
Status: DISCONTINUED | OUTPATIENT
Start: 2022-12-06 | End: 2023-01-25

## 2022-12-06 RX ORDER — LIDOCAINE HYDROCHLORIDE 20 MG/ML
INJECTION INTRAVENOUS
Status: DISCONTINUED | OUTPATIENT
Start: 2022-12-06 | End: 2022-12-06

## 2022-12-06 RX ORDER — HYDROMORPHONE HYDROCHLORIDE 2 MG/ML
0.4 INJECTION, SOLUTION INTRAMUSCULAR; INTRAVENOUS; SUBCUTANEOUS EVERY 5 MIN PRN
Status: DISCONTINUED | OUTPATIENT
Start: 2022-12-06 | End: 2022-12-06 | Stop reason: HOSPADM

## 2022-12-06 RX ORDER — OXYCODONE AND ACETAMINOPHEN 5; 325 MG/1; MG/1
1 TABLET ORAL EVERY 4 HOURS PRN
Qty: 20 TABLET | Refills: 0 | Status: SHIPPED | OUTPATIENT
Start: 2022-12-06 | End: 2023-01-25

## 2022-12-06 RX ORDER — DEXAMETHASONE SODIUM PHOSPHATE 4 MG/ML
INJECTION, SOLUTION INTRA-ARTICULAR; INTRALESIONAL; INTRAMUSCULAR; INTRAVENOUS; SOFT TISSUE
Status: DISCONTINUED | OUTPATIENT
Start: 2022-12-06 | End: 2022-12-06

## 2022-12-06 RX ORDER — SODIUM CHLORIDE 0.9 % (FLUSH) 0.9 %
3 SYRINGE (ML) INJECTION
Status: DISCONTINUED | OUTPATIENT
Start: 2022-12-06 | End: 2022-12-06 | Stop reason: HOSPADM

## 2022-12-06 RX ORDER — PROCHLORPERAZINE EDISYLATE 5 MG/ML
5 INJECTION INTRAMUSCULAR; INTRAVENOUS EVERY 30 MIN PRN
Status: DISCONTINUED | OUTPATIENT
Start: 2022-12-06 | End: 2022-12-06 | Stop reason: HOSPADM

## 2022-12-06 RX ORDER — ONDANSETRON 2 MG/ML
4 INJECTION INTRAMUSCULAR; INTRAVENOUS EVERY 4 HOURS PRN
Status: CANCELLED | OUTPATIENT
Start: 2022-12-06

## 2022-12-06 RX ORDER — HYDROMORPHONE HYDROCHLORIDE 2 MG/ML
INJECTION, SOLUTION INTRAMUSCULAR; INTRAVENOUS; SUBCUTANEOUS
Status: DISCONTINUED | OUTPATIENT
Start: 2022-12-06 | End: 2022-12-06

## 2022-12-06 RX ORDER — HYDROMORPHONE HYDROCHLORIDE 2 MG/ML
0.2 INJECTION, SOLUTION INTRAMUSCULAR; INTRAVENOUS; SUBCUTANEOUS
Status: CANCELLED | OUTPATIENT
Start: 2022-12-06

## 2022-12-06 RX ORDER — ONDANSETRON 2 MG/ML
INJECTION INTRAMUSCULAR; INTRAVENOUS
Status: DISCONTINUED | OUTPATIENT
Start: 2022-12-06 | End: 2022-12-06

## 2022-12-06 RX ORDER — MUPIROCIN 20 MG/G
OINTMENT TOPICAL
Status: DISCONTINUED | OUTPATIENT
Start: 2022-12-06 | End: 2022-12-06 | Stop reason: HOSPADM

## 2022-12-06 RX ORDER — OXYCODONE HYDROCHLORIDE 5 MG/1
5 TABLET ORAL
Status: DISCONTINUED | OUTPATIENT
Start: 2022-12-06 | End: 2022-12-06 | Stop reason: HOSPADM

## 2022-12-06 RX ORDER — PROPOFOL 10 MG/ML
VIAL (ML) INTRAVENOUS CONTINUOUS PRN
Status: DISCONTINUED | OUTPATIENT
Start: 2022-12-06 | End: 2022-12-06

## 2022-12-06 RX ORDER — DIPHENHYDRAMINE HYDROCHLORIDE 50 MG/ML
12.5 INJECTION INTRAMUSCULAR; INTRAVENOUS EVERY 30 MIN PRN
Status: COMPLETED | OUTPATIENT
Start: 2022-12-06 | End: 2022-12-06

## 2022-12-06 RX ORDER — ALBUMIN HUMAN 50 G/1000ML
SOLUTION INTRAVENOUS CONTINUOUS PRN
Status: DISCONTINUED | OUTPATIENT
Start: 2022-12-06 | End: 2022-12-06

## 2022-12-06 RX ORDER — MIDAZOLAM HYDROCHLORIDE 1 MG/ML
INJECTION INTRAMUSCULAR; INTRAVENOUS
Status: DISCONTINUED | OUTPATIENT
Start: 2022-12-06 | End: 2022-12-06

## 2022-12-06 RX ORDER — ACETAMINOPHEN 325 MG/1
975 TABLET ORAL
Status: COMPLETED | OUTPATIENT
Start: 2022-12-06 | End: 2022-12-06

## 2022-12-06 RX ADMIN — OXYCODONE 5 MG: 5 TABLET ORAL at 04:12

## 2022-12-06 RX ADMIN — Medication 30 MG: at 09:12

## 2022-12-06 RX ADMIN — HYDROMORPHONE HYDROCHLORIDE 0.5 MG: 2 INJECTION INTRAMUSCULAR; INTRAVENOUS; SUBCUTANEOUS at 10:12

## 2022-12-06 RX ADMIN — HYDROMORPHONE HYDROCHLORIDE 0.4 MG: 2 INJECTION INTRAMUSCULAR; INTRAVENOUS; SUBCUTANEOUS at 12:12

## 2022-12-06 RX ADMIN — HYDROMORPHONE HYDROCHLORIDE 0.4 MG: 2 INJECTION INTRAMUSCULAR; INTRAVENOUS; SUBCUTANEOUS at 11:12

## 2022-12-06 RX ADMIN — ROCURONIUM BROMIDE 20 MG: 10 SOLUTION INTRAVENOUS at 09:12

## 2022-12-06 RX ADMIN — DEXTROSE 2 G: 50 INJECTION, SOLUTION INTRAVENOUS at 09:12

## 2022-12-06 RX ADMIN — METHYLENE BLUE 50 MG: 5 INJECTION INTRAVENOUS at 10:12

## 2022-12-06 RX ADMIN — PROPOFOL 50 MCG/KG/MIN: 10 INJECTION, EMULSION INTRAVENOUS at 09:12

## 2022-12-06 RX ADMIN — ONDANSETRON HYDROCHLORIDE 4 MG: 2 INJECTION INTRAMUSCULAR; INTRAVENOUS at 09:12

## 2022-12-06 RX ADMIN — ROCURONIUM BROMIDE 15 MG: 10 SOLUTION INTRAVENOUS at 10:12

## 2022-12-06 RX ADMIN — SCOLOPAMINE TRANSDERMAL SYSTEM 1 PATCH: 1 PATCH, EXTENDED RELEASE TRANSDERMAL at 07:12

## 2022-12-06 RX ADMIN — PROPOFOL 200 MG: 10 INJECTION, EMULSION INTRAVENOUS at 09:12

## 2022-12-06 RX ADMIN — ACETAMINOPHEN 975 MG: 325 TABLET, FILM COATED ORAL at 07:12

## 2022-12-06 RX ADMIN — SODIUM CHLORIDE, SODIUM LACTATE, POTASSIUM CHLORIDE, AND CALCIUM CHLORIDE: 600; 310; 30; 20 INJECTION, SOLUTION INTRAVENOUS at 08:12

## 2022-12-06 RX ADMIN — DIPHENHYDRAMINE HYDROCHLORIDE 12.5 MG: 50 INJECTION, SOLUTION INTRAMUSCULAR; INTRAVENOUS at 01:12

## 2022-12-06 RX ADMIN — ROCURONIUM BROMIDE 50 MG: 10 SOLUTION INTRAVENOUS at 09:12

## 2022-12-06 RX ADMIN — OXYCODONE 5 MG: 5 TABLET ORAL at 11:12

## 2022-12-06 RX ADMIN — DEXAMETHASONE SODIUM PHOSPHATE 8 MG: 4 INJECTION, SOLUTION INTRAMUSCULAR; INTRAVENOUS at 09:12

## 2022-12-06 RX ADMIN — LIDOCAINE HYDROCHLORIDE 50 MG: 20 INJECTION, SOLUTION INTRAVENOUS at 09:12

## 2022-12-06 RX ADMIN — ALBUMIN (HUMAN): 12.5 SOLUTION INTRAVENOUS at 09:12

## 2022-12-06 RX ADMIN — MUPIROCIN: 20 OINTMENT TOPICAL at 08:12

## 2022-12-06 RX ADMIN — FAMOTIDINE 20 MG: 20 TABLET ORAL at 07:12

## 2022-12-06 RX ADMIN — FENTANYL CITRATE 100 MCG: 50 INJECTION, SOLUTION INTRAMUSCULAR; INTRAVENOUS at 09:12

## 2022-12-06 RX ADMIN — DIPHENHYDRAMINE HYDROCHLORIDE 12.5 MG: 50 INJECTION, SOLUTION INTRAMUSCULAR; INTRAVENOUS at 11:12

## 2022-12-06 RX ADMIN — SUGAMMADEX 400 MG: 100 INJECTION, SOLUTION INTRAVENOUS at 11:12

## 2022-12-06 RX ADMIN — MIDAZOLAM HYDROCHLORIDE 2 MG: 1 INJECTION, SOLUTION INTRAMUSCULAR; INTRAVENOUS at 09:12

## 2022-12-06 RX ADMIN — GLYCOPYRROLATE 0.2 MG: 0.2 INJECTION, SOLUTION INTRAMUSCULAR; INTRAVITREAL at 09:12

## 2022-12-06 NOTE — ANESTHESIA POSTPROCEDURE EVALUATION
Anesthesia Post Evaluation    Patient: Silvia Bustamante    Procedure(s) Performed: Procedure(s) (LRB):  ROBOTIC HYSTERECTOMY (N/A)  CYSTOSCOPY (N/A)    Final Anesthesia Type: general      Patient location during evaluation: PACU  Patient participation: Yes- Able to Participate  Level of consciousness: awake and alert  Post-procedure vital signs: reviewed and stable  Pain management: adequate  Airway patency: patent    PONV status at discharge: No PONV  Anesthetic complications: no      Cardiovascular status: blood pressure returned to baseline  Respiratory status: spontaneous ventilation  Hydration status: euvolemic  Follow-up not needed.          Vitals Value Taken Time   /70 12/06/22 1137   Temp 36.2 °C (97.1 °F) 12/06/22 1118   Pulse 69 12/06/22 1138   Resp 16 12/06/22 1118   SpO2 98 % 12/06/22 1138   Vitals shown include unvalidated device data.      No case tracking events are documented in the log.      Pain/Ashley Score: Pain Rating Prior to Med Admin: 0 (12/6/2022  7:48 AM)

## 2022-12-06 NOTE — ANESTHESIA PREPROCEDURE EVALUATION
12/06/2022  Silvia Bustamante is a 42 y.o., female.    Pre-op Assessment    I have reviewed the Patient Summary Reports.    I have reviewed the Nursing Notes. I have reviewed the NPO Status.   I have reviewed the Medications.     Review of Systems  Anesthesia Hx:  Denies Family Hx of Anesthesia complications.  Personal Hx of Anesthesia complications, Post-Operative Nausea/Vomiting   Hematology/Oncology:         -- Anemia:   Cardiovascular:   Hypertension    Pulmonary:  Pulmonary Normal    Renal/:  Renal/ Normal     Hepatic/GI:  Hepatic/GI Normal Gastric Bypass   Neurological:  Neurology Normal    Endocrine:  Endocrine Normal        Physical Exam  General:  Well nourished      Airway/Jaw/Neck:  Airway Findings: Mouth Opening: Normal   Tongue: Normal   General Airway Assessment: Adult Mallampati: II  TM Distance: Normal, at least 6 cm       Dental:  Dental Findings:         Mental Status:  Mental Status Findings:  Alert and Oriented, Cooperative         Anesthesia Plan  Type of Anesthesia, risks & benefits discussed:  Anesthesia Type:  Gen ETT    Patient's Preference:   Plan Factors:          Intra-op Monitoring Plan: standard ASA monitors  Intra-op Monitoring Plan Comments:   Post Op Pain Control Plan:   Post Op Pain Control Plan Comments:     Induction:   IV  Beta Blocker:         Informed Consent: Informed consent signed with the Patient and all parties understand the risks and agree with anesthesia plan.  All questions answered.  Anesthesia consent signed with patient.  ASA Score: 2     Day of Surgery Review of History & Physical:              Ready For Surgery From Anesthesia Perspective.           Physical Exam  General: Well nourished    Airway:  Mallampati: II   Mouth Opening: Normal  TM Distance: Normal, at least 6 cm  Tongue: Normal    Dental:          Anesthesia Plan  Type of Anesthesia, risks &  benefits discussed:    Anesthesia Type: Gen ETT  Intra-op Monitoring Plan: standard ASA monitors  Induction:  IV  Airway Plan: Video  Informed Consent: Informed consent signed with the Patient and all parties understand the risks and agree with anesthesia plan.  All questions answered.   ASA Score: 2    Ready For Surgery From Anesthesia Perspective.       .

## 2022-12-06 NOTE — H&P
[facility]  Obstetrics & Gynecology  History & Physical    Patient Name: Silvia Bustamante  MRN: [id number]  Admission Date: 2022  Primary Care Provider: Primary Doctor No    Subjective:     Chief Complaint/Reason for Admission: AUB    History of Present Illness: Patient presents today for RA-TLH/BS secondary to AUB and severe dysmenorrhea. Hx of multiple minimally invasive abdominal surgeries as well as a gastric bypass.     TVUS shows uterus to be measuring 8.5cm a 4.3cm x 4.2cm.     No current facility-administered medications on file prior to encounter.     Current Outpatient Medications on File Prior to Encounter   Medication Sig    acetaminophen (TYLENOL ORAL) Take by mouth.    meclizine (ANTIVERT) 25 mg tablet Take 25 mg by mouth 3 (three) times daily as needed.    valACYclovir (VALTREX) 500 MG tablet Take 1 tablet (500 mg total) by mouth once daily.       Review of patient's allergies indicates:   Allergen Reactions    Sulfa (sulfonamide antibiotics) Nausea And Vomiting    Lortab asa [hydrocodone-aspirin] Rash     Liquid only  Can take oxycodone, hydrocodone pill, tylenol       Past Medical History:   Diagnosis Date    Anemia     Herpes simplex without mention of complication     Hypertension     PONV (postoperative nausea and vomiting)     S/P  2017     OB History    Para Term  AB Living   2 2 1 1   3   SAB IAB Ectopic Multiple Live Births         1 3      # Outcome Date GA Lbr Edu/2nd Weight Sex Delivery Anes PTL Lv   2 Term 17 39w1d  3.07 kg (6 lb 12.3 oz) M CS-LTranv Spinal, EPI N FUNMI   1A  14 35w4d  2.07 kg (4 lb 9 oz) F CS-LTranv EPI, Spinal  FUNMI   1B  14 35w4d  2.56 kg (5 lb 10.3 oz) M CS-LTranv EPI, Spinal  FUNMI     Past Surgical History:   Procedure Laterality Date    APPENDECTOMY      corrective surgery for adhesion due to the appendectomy     SECTION  2014 / 2017    X 2---KJB    CHOLECYSTECTOMY      GASTRIC  BYPASS  2013    AFTER HER BAND BROKE    HERNIA REPAIR      x2    LAPAROSCOPIC GASTRIC BANDING  2009    MOUTH SURGERY      Plastic surgery on chin       Family History       Problem Relation (Age of Onset)    Colon cancer Paternal Grandfather    Diabetes Maternal Grandmother    Hypertension Maternal Grandmother, Paternal Grandfather, Paternal Grandmother, Maternal Grandfather, Father, Mother, Brother          Tobacco Use    Smoking status: Never    Smokeless tobacco: Never   Substance and Sexual Activity    Alcohol use: No     Comment: Socially ( pre pregnancy)    Drug use: No    Sexual activity: Yes     Partners: Male     Birth control/protection: None     Review of Systems   Gastrointestinal:  Negative for abdominal pain.   Genitourinary:  Positive for menorrhagia. Negative for dysuria, pelvic pain and vaginal discharge.   Objective:     Vital Signs (Most Recent):  Temp: 98.5 °F (36.9 °C) (12/06/22 0734)  Pulse: (!) 55 (12/06/22 0734)  Resp: 16 (12/06/22 0734)  BP: (!) 146/83 (12/06/22 0734)  SpO2: 100 % (12/06/22 0732)   Vital Signs (24h Range):  Temp:  [98.5 °F (36.9 °C)] 98.5 °F (36.9 °C)  Pulse:  [55] 55  Resp:  [16] 16  SpO2:  [100 %] 100 %  BP: (146)/(83) 146/83     Weight: 77.1 kg (170 lb)  Body mass index is 29.18 kg/m².  Patient's last menstrual period was 11/23/2022.    Physical Exam:   Constitutional: She appears well-developed and well-nourished. No distress.    HENT:   Head: Atraumatic.    Eyes: EOM are normal.     Cardiovascular:  Normal rate.             Pulmonary/Chest: Effort normal.        Abdominal: Soft.             Musculoskeletal: Normal range of motion.       Neurological: She is alert.    Skin: Skin is warm.    Psychiatric: She has a normal mood and affect.         Assessment/Plan:     Active Diagnoses:    Diagnosis Date Noted POA    PRINCIPAL PROBLEM:  DUB (dysfunctional uterine bleeding) [N93.8] 12/06/2022 Yes    Dysmenorrhea [N94.6] 12/06/2022 Unknown      Problems Resolved During this  Admission:       AUB/Dysmenorrhea  - Patient consented in holding for RA-TLH/BS  - Previously counseled on options in clinic and hysterectomy was desired  - All questions answered by Dr. Foster.     F/U with Dr. Foster for post op visit.     Adrianna Singh MD  Obstetrics & Gynecology  [facility] - Surgery ([place])      Ralf Foster MD

## 2022-12-06 NOTE — OPERATIVE NOTE ADDENDUM
Certification of Assistant at Surgery       Surgery Date: 12/6/2022     Participating Surgeons:  Surgeon(s) and Role:     * Ralf Foster Jr., MD - Primary    Procedures:  Procedure(s) (LRB):  ROBOTIC HYSTERECTOMY (N/A)  CYSTOSCOPY (N/A)    Assistant Surgeon's Certification of Necessity:  I understand that section 1842 (b) (6) (d) of the Social Security Act generally prohibits Medicare Part B reasonable charge payment for the services of assistants at surgery in teaching hospitals when qualified residents are available to furnish such services. I certify that the services for which payment is claimed were medically necessary, and that no qualified resident was available to perform the services. I further understand that these services are subject to post-payment review by the Medicare carrier.      JANELL Levi    12/06/2022  11:24 AM

## 2022-12-06 NOTE — TRANSFER OF CARE
"Anesthesia Transfer of Care Note    Patient: Silvia Bustamante    Procedure(s) Performed: Procedure(s) (LRB):  ROBOTIC HYSTERECTOMY (N/A)  CYSTOSCOPY (N/A)    Patient location: PACU    Anesthesia Type: general    Transport from OR: Transported from OR on 6-10 L/min O2 by face mask with adequate spontaneous ventilation    Post pain: adequate analgesia    Post assessment: no apparent anesthetic complications    Post vital signs: stable    Level of consciousness: awake    Nausea/Vomiting: no nausea/vomiting    Complications: none    Transfer of care protocol was followed      Last vitals:   Visit Vitals  BP (!) 146/83   Pulse (!) 55   Temp 36.2 °C (97.1 °F) (Temporal)   Resp 16   Ht 5' 4" (1.626 m)   Wt 77.1 kg (170 lb)   LMP 11/23/2022   SpO2 100%   Breastfeeding No   BMI 29.18 kg/m²     "

## 2022-12-06 NOTE — PLAN OF CARE
Silvia Bustamante has met all discharge criteria from Phase II. Vital Signs are stable, ambulating  without difficulty. Discharge instructions given, patient verbalized understanding. Discharged from facility via wheelchair in stable condition.

## 2022-12-06 NOTE — DISCHARGE SUMMARY
[facility] Surgery ([place])  Brief Operative Note    Surgery Date: 12/6/2022     Surgeon(s) and Role:     * Long Foster Jr., MD - Primary     * Adrianna Singh MD - Resident Assisting    Assisting Surgeon: None    Pre-op Diagnosis:  Dysmenorrhea [N94.6]  DUB (dysfunctional uterine bleeding) [N93.8]    Post-op Diagnosis:  Post-Op Diagnosis Codes:     * Dysmenorrhea [N94.6]     * DUB (dysfunctional uterine bleeding) [N93.8]    Procedure(s) (LRB):  ROBOTIC HYSTERECTOMY (N/A)  CYSTOSCOPY (N/A)    Anesthesia: General    Operative Findings: Normal appearing uterus, cervix, bilateral ovaries. Uterus sounded to 9cm. Uncomplicated total hysterectomy performed. Excellent hemostasis noted at conclusion of case. Post-operative cystoscopy performed, showing bilateral efflux of ureteral orifices. No evidence of bladder injury.       Estimated Blood Loss: 50 mL         Specimens:   Specimen (24h ago, onward)       Start     Ordered    12/06/22 1004  Specimen to Pathology, Surgery Gynecology and Obstetrics  Once        Comments: Pre-op Diagnosis: Dysmenorrhea [N94.6]DUB (dysfunctional uterine bleeding) [N93.8]Procedure(s):ROBOTIC HYSTERECTOMY Number of specimens: 1Name of specimens: 1. Uterus and cervix     References:    Click here for ordering Quick Tip   Question Answer Comment   Procedure Type: Gynecology and Obstetrics    Specimen Class: Routine/Screening    Which provider would you like to cc? LONG FOSTER JR    Release to patient Immediate        12/06/22 1034                      Discharge Note    OUTCOME: Patient tolerated treatment/procedure well without complication and is now ready for discharge.    DISPOSITION: Home or Self Care    FINAL DIAGNOSIS:  Status post total hysterectomy    FOLLOWUP: In clinic    DISCHARGE INSTRUCTIONS:    Discharge Procedure Orders   Diet general     Lifting restrictions   Order Comments: No lifting greater than 15 pounds for six weeks.     Other restrictions (specify):   Order  Comments: PELVIC REST:  No douching, tampons, or intercourse for 6 weeks.    If prescribed, vaginal estrogen cream may be used during the postoperative period.     DRIVING:  No driving while on narcotics. Driving may be resumed initially with a competent passenger one to two weeks after surgery if no longer taking narcotics.     EXERCISE:  For six weeks your exercise should be limited to walking. You may walk as far as you wish, as long as you increase your level of exertion gradually and avoid slippery surfaces. You may climb stairs as needed to get around, but should not use stair climbing for exercise.     Remove dressing in 24 hours   Order Comments: If you have a bandage on wound, you may remove it the day after dismissal.  If you had steri-strips remove them once they begin to peel off (usually 2 weeks). Keep incision clean and dry.  Inspect the incision daily for signs and symptoms of infection.     Wound care routine (specify)   Order Comments: WOUND CARE:  If you have a band-aid or bandage on your wound, you may remove it the day after dismissal.  If you had steri-strips remove them once they begin to peel off (usually 2 weeks).  If your steri-strips still haven't come off in 2 weeks, please remove them. You may wash the wound with mild soap and water.   You may shower at any time but should avoid immersing any abdominal incisions in water for at least two weeks after surgery or until the wound is completely healed. If given, please shower with Hibiclens soap until bottle is completely finished. Keep your wound clean and dry.  You should observe your incision for signs of infection which include redness, warmth, drainage or fever.     Call MD for:  temperature >100.4     Call MD for:  persistent nausea and vomiting     Call MD for:  severe uncontrolled pain     Call MD for:  difficulty breathing, headache or visual disturbances     Call MD for:  redness, tenderness, or signs of infection (pain, swelling,  redness, odor or green/yellow discharge around incision site)     Call MD for:  hives     Call MD for:   Order Comments: inability to void,urine is ketchup colored or you have large clots, vaginal bleeding is heavier than a period.    VAGINAL DISCHARGE: You may develop a vaginal discharge and intermittent vaginal spotting after surgery and up to 6 weeks postoperatively.  The discharge may have an odor and may change in color but it is normal.  This is due to dissolving stiches.  Contact your surgical team if you develop vaginal or vulvar irritation along with a discharge.  Also contact your surgical team if you have vaginal discharge that smells like urine or stool.    CONSTIPATION REMEDIES: Patients are often constipated after surgery or with use of oral narcotic medicine. You should continue to take the stool softener, Senokot-S during the next six weeks, and consume adequate amounts of water.  If you have not had a bowel movement for 3 days after dismissal, or are uncomfortable and unable to pass stool, please try one or all of the following measures:  1.  Milk of Magnesia - 30 cc by mouth every 12 hours   2.  Dulcolax suppository - One suppository per rectum every 4-6 hours   3.  Metamucil, Fibercon or other bulk former - use as directed  4.  Fleets Enema        PAIN MEDICATIONS:     Take your pain medications as instructed. It is best to take pain medications before your pain becomes severe. This will allow you to take less medication yet have better pain relief. For the first 2 or 3 days it may be helpful to take your pain medications on a regular schedule (e.g. every 4 to 6 hours). This will help you to keep your pain under better control. You should then begin to take fewer medications each day until you no longer need them. Do not take pain medication on an empty stomach. This may lead to nausea and vomiting.     Activity as tolerated   Order Comments: Return to normal activity slowly as you feel able.  For  6 weeks your exercise should be limited to walking.  You may walk as far as you wish, as long as you increase your level of exertion gradually and avoid slippery surfaces.    If you had a hysterectomy at the surgery do not insert anything in your vagina for 9 weeks.     Shower on day dressing removed (No bath)   Order Comments: You may shower at any time but should avoid immersing any abdominal incisions in water for at least 2 weeks after surgery or until the wound is completely healed.  If given, please shower with Hibaclens soap until bottle is completely finish        Medication List        START taking these medications      oxyCODONE-acetaminophen 5-325 mg per tablet  Commonly known as: PERCOCET  Take 1 tablet by mouth every 4 (four) hours as needed for Pain.            CONTINUE taking these medications      meclizine 25 mg tablet  Commonly known as: ANTIVERT     TYLENOL ORAL     valACYclovir 500 MG tablet  Commonly known as: VALTREX  Take 1 tablet (500 mg total) by mouth once daily.               Where to Get Your Medications        These medications were sent to [facility]  [address] [place] [address]      Hours: Mon-Fri, 8a-5:30p Phone: [phone]   oxyCODONE-acetaminophen 5-325 mg per tablet       Adrianna Singh M.D.  OB/GYN PGY-4    Ralf Foster MD

## 2022-12-07 ENCOUNTER — PATIENT MESSAGE (OUTPATIENT)
Dept: OBSTETRICS AND GYNECOLOGY | Facility: CLINIC | Age: 42
End: 2022-12-07
Payer: COMMERCIAL

## 2022-12-07 VITALS
BODY MASS INDEX: 29.02 KG/M2 | OXYGEN SATURATION: 96 % | HEART RATE: 52 BPM | WEIGHT: 170 LBS | HEIGHT: 64 IN | RESPIRATION RATE: 17 BRPM | DIASTOLIC BLOOD PRESSURE: 74 MMHG | SYSTOLIC BLOOD PRESSURE: 119 MMHG | TEMPERATURE: 99 F

## 2022-12-12 NOTE — OP NOTE
[facility]  Surgery Department  Operative Note    SUMMARY     Date of Procedure: 12/6/2022     Procedure: Procedure(s) (LRB):  ROBOTIC HYSTERECTOMY (N/A)  CYSTOSCOPY (N/A)     Surgeon(s) and Role:     * Ralf Foster Jr., MD - Primary    Assisting Surgeon:       * Didi Francois NP---There was no qualified Resident to perform her portion of the surgery      Pre-Operative Diagnosis: Dysmenorrhea [N94.6]  DUB (dysfunctional uterine bleeding) [N93.8]    Post-Operative Diagnosis: Post-Op Diagnosis Codes:     * Dysmenorrhea [N94.6]     * DUB (dysfunctional uterine bleeding) [N93.8]    Anesthesia: General    Operative Findings (including complications, if any):   The vulva vagina and cervix were within normal limits.  Upon entering the abdominal cavity it was noted that the upper abdomen was normal free of adhesive disease or disease or any other disease process.  Pelvis was also noted to be normal with no evidence of disease.    Description of Technical Procedures:   PROCEDURE IN DETAIL:    Patient was taken to the operating room where general anesthesia was administered and found to be adequate. She was placed in the dorsal lithotomy position using Kel stirrups and prepped and draped in the usual sterile fashion. A surgical timeout was performed with patient's name, date of birth, allergies, and procedure to be performed verbalized. All OR staff were in agreement. Preoperative antibiotics ancef 2 g were administered.     Attention was then turned to the vagina. Leon was placed. A weighted sterile speculum and right angle retractor were placed in the vagina. The anterior lip of the cervix was grasped with a single tooth tenaculum. The uterus was sounded to approximately 5 cm. A stay suture of 0-Vicryl was placed at 12 o'clock and 6 o'clock on the cervix. A 8 cm DANIELLE manipulator with 3.5 cm cup was placed inside the uterine cavity, and vagina was occluded.      Gloves were changed. A Veress needle was  inserted into the umbilicus under tenting of the anterior abdominal wall. Placement into the peritoneal cavity was confirmed via saline drop test. The abdomen was insufflated to 20 mm Hg using Carbon dioxide. An 8 mm supraumbilical skin incision was made with the scalpel. An 8 mm trocar was advanced through this incision. Good hemostasis was noted. The patient was placed in deep Trendelenburg and a thorough examination of the pelvic and abdominal anatomy demonstrated the above findings. An 8 mm left lateral skin incision was made with a scalpel and a 8 mm trocar was advanced through this incision under visualization of the camera. An 8 mm right lateral skin incision was made with the scalpel. An 8 mm trocar was advanced through this incision under visualization of the camera. Additionally an 8 mm accessory port was placed in the left upper quadrant. Good hemostasis was noted. The intra-abdominal pressure was then dropped to 15 mmHg and the robot was appropriately docked.      At this point, attention was turned to the left round ligament which was identified cauterized and transected.  The anterior leaf of the broad ligament was then carried down to the level of anterior cervix in order to make a bladder flap.  The bladder was then carefully dissected off of the anterior cervix and lower uterine segment. The utero-ovarian ligament was then identified, cauterized and transected.  The posterior leaf of the broad ligament was then carried down to the level of the cervix skeletonizing the uterine vessels.  The uterine vessels were then cauterized and transected.  At this point attention was turned to the right adnexa, where the right round ligament was identified, cauterized and transected.  The anterior leaf of the broad ligament was then carried down to the level of cervix completing the bladder flap.  Additional dissection of the bladder was performed at this time. The right utero-ovarian ligament was then cauterized  and transected.  The posterior broad ligament was then transected and carried down to the level of the cervix in order to skeletonize the uterine vessels.  The uterine vessels were then cauterized and transected.  With at this point a posterior colpotomy was made in carried forward bilaterally until the specimen was amputated.       Given the size of the specimen, the uterus, cervix and fallopian tubes were removed through the vagina .  At this point moistened laparotomy sponge inside of a surgical glove was placed inside the vagina to maintain pneumoperitoneum. Attention was then turned to the closure of the vaginal cuff which was closed with 3 interrupted mattress sutures of 0 Vicryl.  The closure was thoroughly inspected and found to be well reapproximated and hemostatic.  Isolated areas of minimal oozing were controlled with bipolar cautery.  Excellent hemostasis was noted. The pelvis was copiously irrigated and suctioned.  At this point the surgical instruments were removed, the robot was undocked and the abdomen was deflated.     Leon was then removed for cystoscopy. The cystoscope was introduced into the bladder and inspection of the dome showed no evidence of cystotomy or injury. Good efflux of urine was noted from bilateral ureteral orifices. Cystoscope was withdrawn and lap removed from the vagina.     Second look laparoscopy showed excellent hemostasis. Attention was turned to the anterior abdominal wall. The skin was closed with 4-0 Monocryl in a subcuticular fashion. Sponge, lap, and needle counts were correct x 2. The patient was taken to the recovery room in stable condition.       Estimated Blood Loss (EBL): 50 mL           Implants: * No implants in log *    Specimens:   Specimen (24h ago, onward)      None                    Condition: Good    Disposition: PACU - hemodynamically stable.    Attestation: I was present and scrubbed for the entire procedure.      Ralf Foster MD

## 2022-12-13 LAB
FINAL PATHOLOGIC DIAGNOSIS: NORMAL
Lab: NORMAL

## 2022-12-21 ENCOUNTER — PATIENT MESSAGE (OUTPATIENT)
Dept: OBSTETRICS AND GYNECOLOGY | Facility: CLINIC | Age: 42
End: 2022-12-21
Payer: COMMERCIAL

## 2022-12-21 RX ORDER — FLUCONAZOLE 150 MG/1
150 TABLET ORAL ONCE
Qty: 1 TABLET | Refills: 1 | Status: SHIPPED | OUTPATIENT
Start: 2022-12-21 | End: 2022-12-21

## 2022-12-21 RX ORDER — VALACYCLOVIR HYDROCHLORIDE 500 MG/1
500 TABLET, FILM COATED ORAL 2 TIMES DAILY
Qty: 10 TABLET | Refills: 12 | Status: SHIPPED | OUTPATIENT
Start: 2022-12-21 | End: 2022-12-26

## 2022-12-27 ENCOUNTER — TELEPHONE (OUTPATIENT)
Dept: OBSTETRICS AND GYNECOLOGY | Facility: CLINIC | Age: 42
End: 2022-12-27
Payer: COMMERCIAL

## 2022-12-27 NOTE — TELEPHONE ENCOUNTER
----- Message from Kala Saldana sent at 12/27/2022 12:45 PM CST -----  Name of Who is Calling: [place]              What is the request in detail:Need clarification for valACYclovir (VALTREX) 500 MG tablet              Can the clinic reply by [facility]:no              What Number [facility] Back [facility]:[phone]

## 2023-01-18 ENCOUNTER — PATIENT MESSAGE (OUTPATIENT)
Dept: OBSTETRICS AND GYNECOLOGY | Facility: CLINIC | Age: 43
End: 2023-01-18
Payer: COMMERCIAL

## 2023-01-18 RX ORDER — VALACYCLOVIR HYDROCHLORIDE 500 MG/1
500 TABLET, FILM COATED ORAL DAILY
Qty: 30 TABLET | Refills: 12 | Status: SHIPPED | OUTPATIENT
Start: 2023-01-18 | End: 2024-01-18

## 2023-01-25 ENCOUNTER — OFFICE VISIT (OUTPATIENT)
Dept: OBSTETRICS AND GYNECOLOGY | Facility: CLINIC | Age: 43
End: 2023-01-25
Payer: COMMERCIAL

## 2023-01-25 VITALS
BODY MASS INDEX: 29.02 KG/M2 | WEIGHT: 169.06 LBS | DIASTOLIC BLOOD PRESSURE: 70 MMHG | SYSTOLIC BLOOD PRESSURE: 112 MMHG

## 2023-01-25 DIAGNOSIS — Z90.710 STATUS POST TOTAL HYSTERECTOMY: Primary | ICD-10-CM

## 2023-01-25 PROCEDURE — 3074F SYST BP LT 130 MM HG: CPT | Mod: CPTII,S$GLB,, | Performed by: OBSTETRICS & GYNECOLOGY

## 2023-01-25 PROCEDURE — 1160F PR REVIEW ALL MEDS BY PRESCRIBER/CLIN PHARMACIST DOCUMENTED: ICD-10-PCS | Mod: CPTII,S$GLB,, | Performed by: OBSTETRICS & GYNECOLOGY

## 2023-01-25 PROCEDURE — 3008F BODY MASS INDEX DOCD: CPT | Mod: CPTII,S$GLB,, | Performed by: OBSTETRICS & GYNECOLOGY

## 2023-01-25 PROCEDURE — 99999 PR PBB SHADOW E&M-EST. PATIENT-LVL III: ICD-10-PCS | Mod: PBBFAC,,, | Performed by: OBSTETRICS & GYNECOLOGY

## 2023-01-25 PROCEDURE — 99999 PR PBB SHADOW E&M-EST. PATIENT-LVL III: CPT | Mod: PBBFAC,,, | Performed by: OBSTETRICS & GYNECOLOGY

## 2023-01-25 PROCEDURE — 1160F RVW MEDS BY RX/DR IN RCRD: CPT | Mod: CPTII,S$GLB,, | Performed by: OBSTETRICS & GYNECOLOGY

## 2023-01-25 PROCEDURE — 99024 PR POST-OP FOLLOW-UP VISIT: ICD-10-PCS | Mod: S$GLB,,, | Performed by: OBSTETRICS & GYNECOLOGY

## 2023-01-25 PROCEDURE — 3078F PR MOST RECENT DIASTOLIC BLOOD PRESSURE < 80 MM HG: ICD-10-PCS | Mod: CPTII,S$GLB,, | Performed by: OBSTETRICS & GYNECOLOGY

## 2023-01-25 PROCEDURE — 3074F PR MOST RECENT SYSTOLIC BLOOD PRESSURE < 130 MM HG: ICD-10-PCS | Mod: CPTII,S$GLB,, | Performed by: OBSTETRICS & GYNECOLOGY

## 2023-01-25 PROCEDURE — 3008F PR BODY MASS INDEX (BMI) DOCUMENTED: ICD-10-PCS | Mod: CPTII,S$GLB,, | Performed by: OBSTETRICS & GYNECOLOGY

## 2023-01-25 PROCEDURE — 1159F MED LIST DOCD IN RCRD: CPT | Mod: CPTII,S$GLB,, | Performed by: OBSTETRICS & GYNECOLOGY

## 2023-01-25 PROCEDURE — 99024 POSTOP FOLLOW-UP VISIT: CPT | Mod: S$GLB,,, | Performed by: OBSTETRICS & GYNECOLOGY

## 2023-01-25 PROCEDURE — 1159F PR MEDICATION LIST DOCUMENTED IN MEDICAL RECORD: ICD-10-PCS | Mod: CPTII,S$GLB,, | Performed by: OBSTETRICS & GYNECOLOGY

## 2023-01-25 PROCEDURE — 3078F DIAST BP <80 MM HG: CPT | Mod: CPTII,S$GLB,, | Performed by: OBSTETRICS & GYNECOLOGY

## 2023-01-25 RX ORDER — CYANOCOBALAMIN 1000 UG/ML
1000 INJECTION, SOLUTION INTRAMUSCULAR; SUBCUTANEOUS
Qty: 1 ML | Refills: 5 | Status: SHIPPED | OUTPATIENT
Start: 2023-01-25

## 2023-01-25 NOTE — PROGRESS NOTES
PT HERE S/P [facility].  FEELS TIRED. THINKS SHE NEEDS B 12.    UTERUS, HYSTERECTOMY:   - Cervix without diagnostic abnormality.   - Negative for dysplasia/carcinoma.   - Proliferative endometrium.   - Negative for hyperplasia/carcinoma.   - Myometrium without diagnostic abnormality.   - Serosal adhesions.     ROS:  GENERAL: No fever, chills, fatigability or weight loss.  VULVAR: No pain, no lesions and no itching.  VAGINAL: No relaxation, no itching, no discharge, no abnormal bleeding and no lesions.  ABDOMEN: No abdominal pain. Denies nausea. Denies vomiting. No diarrhea. No constipation  BREAST: Denies pain. No lumps. No discharge.  URINARY: No incontinence, no nocturia, no frequency and no dysuria.  CARDIOVASCULAR: No chest pain. No shortness of breath. No leg cramps.  NEUROLOGICAL: No headaches. No vision changes.  The remainder of the review of systems was negative.    PE:   General Appearance: overweight Well developed. Well nourished. In no acute distress.  Urethral Meatus: Normal size. Normal location. No lesions. No prolapse.  Vulva: Atrophic. Lesions: No.  Urethra: No masses. No tenderness. No prolapse. No scarring.  Bladder: No masses. No tenderness.  Vagina: Mucosa NI: yes Discharge: no Atrophic: no Rectocele: no Cystocele: no Vaginal cuff intact: yes  Cervix: Absent.  Uterus: Absent.  Adnexa: Masses: No Tenderness: No       CDS Nodularity: No   Abdomen: overweight  No masses. No tenderness. HEALED INCISIONS      PROCEDURES:    DIAGNOSIS:  1. Status post robot-assisted total laparoscopic hysterectomy        PLAN:     MEDICATIONS & ORDERS:  Orders Placed This Encounter    cyanocobalamin 1,000 mcg/mL injection       Patient was counseled today on the new ACS guidelines for cervical cytology screening as well as the current recommendations for breast cancer screening. She was counseled to follow up with her PCP for other routine health maintenance. Counseling session lasted approximately 10 minutes, and all her  questions were answered.         FOLLOW-UP: With me in 12 month    Ralf Foster Jr, MD, FACOG

## 2024-02-05 ENCOUNTER — TELEPHONE (OUTPATIENT)
Dept: OBSTETRICS AND GYNECOLOGY | Facility: CLINIC | Age: 44
End: 2024-02-05
Payer: COMMERCIAL

## 2024-02-05 RX ORDER — VALACYCLOVIR HYDROCHLORIDE 500 MG/1
500 TABLET, FILM COATED ORAL DAILY
Qty: 30 TABLET | Refills: 12 | Status: CANCELLED | OUTPATIENT
Start: 2024-02-05 | End: 2025-02-04

## 2024-02-12 RX ORDER — VALACYCLOVIR HYDROCHLORIDE 500 MG/1
500 TABLET, FILM COATED ORAL DAILY
Qty: 90 TABLET | Refills: 3 | Status: SHIPPED | OUTPATIENT
Start: 2024-02-12 | End: 2024-03-07 | Stop reason: SDUPTHER

## 2024-02-15 ENCOUNTER — OFFICE VISIT (OUTPATIENT)
Dept: OBSTETRICS AND GYNECOLOGY | Facility: CLINIC | Age: 44
End: 2024-02-15
Payer: COMMERCIAL

## 2024-02-15 VITALS
SYSTOLIC BLOOD PRESSURE: 132 MMHG | HEIGHT: 64 IN | BODY MASS INDEX: 30.83 KG/M2 | DIASTOLIC BLOOD PRESSURE: 82 MMHG | WEIGHT: 180.56 LBS

## 2024-02-15 DIAGNOSIS — Z01.419 ENCOUNTER FOR ANNUAL ROUTINE GYNECOLOGICAL EXAMINATION: Primary | ICD-10-CM

## 2024-02-15 DIAGNOSIS — Z12.31 BREAST CANCER SCREENING BY MAMMOGRAM: ICD-10-CM

## 2024-02-15 LAB
BILIRUB SERPL-MCNC: NEGATIVE MG/DL
BLOOD URINE, POC: NEGATIVE
CLARITY, POC UA: CLEAR
GLUCOSE UR QL STRIP: NORMAL
KETONES UR QL STRIP: NEGATIVE
LEUKOCYTE ESTERASE URINE, POC: NEGATIVE
Lab: NORMAL
NITRITE, POC UA: POSITIVE
PH, POC UA: 7
PROTEIN, POC: NEGATIVE
SPECIFIC GRAVITY, POC UA: 1
UROBILINOGEN, POC UA: NORMAL

## 2024-02-15 PROCEDURE — 99459 PELVIC EXAMINATION: CPT | Mod: S$GLB,,, | Performed by: OBSTETRICS & GYNECOLOGY

## 2024-02-15 PROCEDURE — 3008F BODY MASS INDEX DOCD: CPT | Mod: CPTII,S$GLB,, | Performed by: OBSTETRICS & GYNECOLOGY

## 2024-02-15 PROCEDURE — 99999 PR PBB SHADOW E&M-EST. PATIENT-LVL III: CPT | Mod: PBBFAC,,, | Performed by: OBSTETRICS & GYNECOLOGY

## 2024-02-15 PROCEDURE — 1159F MED LIST DOCD IN RCRD: CPT | Mod: CPTII,S$GLB,, | Performed by: OBSTETRICS & GYNECOLOGY

## 2024-02-15 PROCEDURE — 3079F DIAST BP 80-89 MM HG: CPT | Mod: CPTII,S$GLB,, | Performed by: OBSTETRICS & GYNECOLOGY

## 2024-02-15 PROCEDURE — 3075F SYST BP GE 130 - 139MM HG: CPT | Mod: CPTII,S$GLB,, | Performed by: OBSTETRICS & GYNECOLOGY

## 2024-02-15 PROCEDURE — 1160F RVW MEDS BY RX/DR IN RCRD: CPT | Mod: CPTII,S$GLB,, | Performed by: OBSTETRICS & GYNECOLOGY

## 2024-02-15 PROCEDURE — 99396 PREV VISIT EST AGE 40-64: CPT | Mod: S$GLB,,, | Performed by: OBSTETRICS & GYNECOLOGY

## 2024-02-15 PROCEDURE — 81002 URINALYSIS NONAUTO W/O SCOPE: CPT | Mod: S$GLB,,, | Performed by: OBSTETRICS & GYNECOLOGY

## 2024-02-15 NOTE — PROGRESS NOTES
SUBJECTIVE:     Chief Complaint: Annual Exam       History of Present Illness:  Annual Exam  Patient presents for annual exam.   She c/o nothing today.  LMP: s/p hyst  She denies any vd, vb, dyspareunia, dysuria, depression, anxiety.  Last pap was in  and was neg. HPV na. Per patient, had abnormals years ago. None on file here.   Birth Control: s/p hyst  declines STD testing.   Seeing hematology for iron infusions and B12 injections. H/o Shar en Y.     GYN screening history:  see above  Mammogram history: neg per patient last year  Colonoscopy history: none  Dexa history: none    FH:   Breast cancer: none  Colon cancer: none  Ovarian cancer: none    Review of patient's allergies indicates:   Allergen Reactions    Sulfa (sulfonamide antibiotics) Nausea And Vomiting    Lortab asa [hydrocodone-aspirin] Rash     Liquid only  Can take oxycodone, hydrocodone pill, tylenol       Past Medical History:   Diagnosis Date    Anemia     Herpes simplex without mention of complication     Hypertension      Past Surgical History:   Procedure Laterality Date    APPENDECTOMY      corrective surgery for adhesion due to the appendectomy     SECTION  2014 / 2017    X 2---KJB    CHOLECYSTECTOMY      CYSTOSCOPY N/A 2022    Surgeon: Ralf Foster Jr., MD    GASTRIC BYPASS  2013    AFTER HER BAND BROKE    HERNIA REPAIR      x2    LAPAROSCOPIC GASTRIC BANDING      MOUTH SURGERY      Plastic surgery on chin      ROBOT-ASSISTED LAPAROSCOPIC HYSTERECTOMY N/A 2022    Surgeon: Ralf Foster Jr., MD     OB History          2    Para   2    Term   1       1    AB        Living   3         SAB        IAB        Ectopic        Multiple   1    Live Births   3               Family History   Problem Relation Age of Onset    Diabetes Maternal Grandmother     Hypertension Maternal Grandmother     Colon cancer Paternal Grandfather     Hypertension Paternal Grandfather     Hypertension Paternal Grandmother      Hypertension Maternal Grandfather     Hypertension Father     Hypertension Mother     Hypertension Brother     Breast cancer Neg Hx     Ovarian cancer Neg Hx     Cancer Neg Hx      Social History     Tobacco Use    Smoking status: Never    Smokeless tobacco: Never   Substance Use Topics    Alcohol use: No     Comment: Socially ( pre pregnancy)    Drug use: No       Current Outpatient Medications   Medication Sig    cyanocobalamin 1,000 mcg/mL injection Inject 1 mL (1,000 mcg total) into the muscle every 28 days.    valACYclovir (VALTREX) 500 MG tablet Take 1 tablet (500 mg total) by mouth once daily.    acetaminophen (TYLENOL ORAL) Take by mouth.     No current facility-administered medications for this visit.       Review of Systems:  GENERAL: No fever, chills, fatigability or weight loss.  CARDIOVASCULAR: No chest pain. No palpitations.  RESPIRATORY: No SOB, no wheezing.  BREAST: Denies pain. No lumps. No discharge.  VULVAR: No pain, no lesions and no itching.  VAGINAL: No relaxation, no itching, no discharge, no abnormal bleeding and no lesions.  ABDOMEN: No abdominal pain. Denies nausea. Denies vomiting. No diarrhea. No constipation  URINARY: No incontinence, no nocturia, no frequency and no dysuria.  NEUROLOGICAL: No headaches. No vision changes.       OBJECTIVE:     Vitals:    02/15/24 1438   BP: 132/82       Patient verbally consents to pelvic and breast exams. Female chaperone present for exams.   Physical Exam:  Gen: NAD, well developed, well-nourished  HEENT: Normocephalic, atraumatic  Eyes: EOM nl, conjuntivae normal  Neck: ROM normal, no thyromegaly  Respiratory: Effort normal   Abd: soft, nontender, no masses palpated  Breast: Normal bilaterally, no masses, lesions or tenderness. No nipple discharge on expression, no lymphadenopathy bilaterally.  SSE:  Vulva: no lesions or rashes  Cervix: absent  BME:   Cervix: absent  Adnexa: nl bilaterally, no masses or fullness palpated  Uterus:  absent  Musculoskeletal: normal ROM  Neuro: alert, AAOx3  Skin: warm and dry  Psych: mood/affect nl, behavior normal, judgement normal, thought content normal        ASSESSMENT:       ICD-10-CM ICD-9-CM    1. Encounter for annual routine gynecological examination  Z01.419 V72.31 POCT URINE DIPSTICK WITHOUT MICROSCOPE      2. Breast cancer screening by mammogram  Z12.31 V76.12 Mammo Digital Screening Bilat             Plan:      Silvia was seen today for annual exam.    Diagnoses and all orders for this visit:    Encounter for annual routine gynecological examination  -     POCT URINE DIPSTICK WITHOUT MICROSCOPE    Breast cancer screening by mammogram  -     Mammo Digital Screening Bilat; Future        Orders Placed This Encounter   Procedures    Mammo Digital Screening Bilat    POCT URINE DIPSTICK WITHOUT MICROSCOPE       Follow up in one year for annual, or prn.    Julie R Jeansonne

## 2024-03-07 RX ORDER — VALACYCLOVIR HYDROCHLORIDE 500 MG/1
500 TABLET, FILM COATED ORAL DAILY
Qty: 90 TABLET | Refills: 3 | Status: SHIPPED | OUTPATIENT
Start: 2024-03-07

## 2024-03-07 NOTE — TELEPHONE ENCOUNTER
Refill Routing Note   Medication(s) are not appropriate for processing by [facility] for the following reason(s):        New or recently adjusted medication    [facility] action(s):  Defer        Medication Therapy Plan: Labs waived for PRN meds (VALACYCLOVIR)      Appointments  past 12m or future 3m with PCP    Date Provider   Last Visit   2/15/2024 Jeansonne, Julie R., MD   Next Visit   Visit date not found Jeansonne, Julie R., MD   ED visits in past 90 days: 0        Note composed:10:06 AM 03/07/2024           No

## 2024-03-07 NOTE — TELEPHONE ENCOUNTER
----- Message from Court Bonilla sent at 2/1/2024  5:50 PM CST -----  Type:  RX Refill Request    Who Called:  Pt  Refill or New Rx: Refill  RX Name and Strength: valACYclovir (VALTREX) 500 MG tablet  How is the patient currently taking it? (ex. 1XDay):  Is this a 30 day or 90 day RX:  Preferred Pharmacy with phone number:[facility]  Local or Mail Order: Mail  Ordering Provider: Ralf Johnson  Would the patient rather a call back or a response via [facility]? Call  Best Call Back Number: [phone]  Additional Information: Pt states a refill request was submitted 5 days ago and Pharmacy has not received the prescription as of today. Pt states Dr. Foster previously filled prescription but he is no longer with [facility].

## (undated) DEVICE — SYS SEE SHARP SCP ANTIFG LNG

## (undated) DEVICE — DRAPE ARM DAVINCI XI

## (undated) DEVICE — SUT MCRYL PLUS 4-0 PS2 27IN

## (undated) DEVICE — TROCAR ENDOPATH XCEL 5X100MM

## (undated) DEVICE — SEE MEDLINE ITEM 157117

## (undated) DEVICE — SUT 0 VICRYL / UR6 (J603)

## (undated) DEVICE — CLOSURE SKIN STERI STRIP 1/2X4

## (undated) DEVICE — CANNULA ENDOPATH XCEL 5X100MM

## (undated) DEVICE — SYR 10CC LUER LOCK

## (undated) DEVICE — ELECTRODE NEEDLE 1IN

## (undated) DEVICE — SET CYSTO IRRIGATION UNIV SPIK

## (undated) DEVICE — SOL NS 1000CC

## (undated) DEVICE — INSERT CUSHIONPRONE VIEW LARGE

## (undated) DEVICE — DRESSING LEUKOPLAST FLEX 1X3IN

## (undated) DEVICE — SOL 9P NACL IRR PIC IL

## (undated) DEVICE — SEAL UNIVERSAL 5MM-8MM XI

## (undated) DEVICE — UNDERGLOVE BIOGEL PI SZ 6.5 LF

## (undated) DEVICE — SEE MEDLINE ITEM 152622

## (undated) DEVICE — PORT ACCESS 5MM W/120MM

## (undated) DEVICE — TIP RUMI BLUE DISPOSABLE 5/BX

## (undated) DEVICE — SOL PVP-I SCRUB 7.5% 4OZ

## (undated) DEVICE — NDL INSUF ULTRA VERESS 120MM

## (undated) DEVICE — PENCIL ELECTROSURG HOLST W/BLD

## (undated) DEVICE — SUT VICRYL PLUS 0 CT1 36IN

## (undated) DEVICE — DEVICE ANC SW STAT FOLEY 6-24

## (undated) DEVICE — IRRIGATOR ENDOSCOPY DISP.

## (undated) DEVICE — GLOVE BIOGEL SKINSENSE PI 6.5

## (undated) DEVICE — VIDEO RENTAL SERVICE CYSTO

## (undated) DEVICE — COVER TIP CURVED SCISSORS XI

## (undated) DEVICE — TIP RUMI KOH-EFFICIENT 3.5

## (undated) DEVICE — DRESSING TELFA N ADH 3X8

## (undated) DEVICE — SOL WATER STRL IRR 1000ML

## (undated) DEVICE — SUT VICRYL 0 27 CT-2

## (undated) DEVICE — SOL POVIDONE PREP IODINE 4 OZ

## (undated) DEVICE — GLOVE ENCORE ORTHO PWDR BRN 7

## (undated) DEVICE — ELECTRODE REM PLYHSV RETURN 9

## (undated) DEVICE — NDL INSUFFLATION VERRES 120MM

## (undated) DEVICE — SOL ELECTROLUBE ANTI-STIC

## (undated) DEVICE — FORCEP 5MM BIPOLAR CUT EVEREST

## (undated) DEVICE — SOL STRL WATER INJ 1000ML BG

## (undated) DEVICE — DRAPE COLUMN DAVINCI XI

## (undated) DEVICE — SOL CLEARIFY VISUALIZATION LAP

## (undated) DEVICE — PAD PERINEAL SUPINE

## (undated) DEVICE — DRAPE STERI LONG

## (undated) DEVICE — PAD ABD 8X10 STERILE

## (undated) DEVICE — SET TRI-LUMEN FILTERED TUBE

## (undated) DEVICE — PACK LAPAROSCOPY BAPTIST

## (undated) DEVICE — TROCAR ENDOPATH XCEL 11MM 10CM

## (undated) DEVICE — TRAY DO THE ROBOT

## (undated) DEVICE — KIT WING PAD POSITIONING

## (undated) DEVICE — GOWN NONREINF SET-IN SLV XL

## (undated) DEVICE — JELLY SURGILUBE 5GR